# Patient Record
Sex: FEMALE | Race: WHITE | NOT HISPANIC OR LATINO
[De-identification: names, ages, dates, MRNs, and addresses within clinical notes are randomized per-mention and may not be internally consistent; named-entity substitution may affect disease eponyms.]

---

## 2018-06-12 ENCOUNTER — RECORD ABSTRACTING (OUTPATIENT)
Age: 63
End: 2018-06-12

## 2018-06-12 DIAGNOSIS — M54.2 CERVICALGIA: ICD-10-CM

## 2018-06-12 DIAGNOSIS — Z87.898 PERSONAL HISTORY OF OTHER SPECIFIED CONDITIONS: ICD-10-CM

## 2018-06-12 DIAGNOSIS — Z87.891 PERSONAL HISTORY OF NICOTINE DEPENDENCE: ICD-10-CM

## 2018-06-12 DIAGNOSIS — Z87.39 PERSONAL HISTORY OF OTHER DISEASES OF THE MUSCULOSKELETAL SYSTEM AND CONNECTIVE TISSUE: ICD-10-CM

## 2018-06-12 DIAGNOSIS — Z83.3 FAMILY HISTORY OF DIABETES MELLITUS: ICD-10-CM

## 2018-06-12 DIAGNOSIS — C32.0 MALIGNANT NEOPLASM OF GLOTTIS: ICD-10-CM

## 2018-06-12 DIAGNOSIS — Z92.89 PERSONAL HISTORY OF OTHER MEDICAL TREATMENT: ICD-10-CM

## 2018-06-12 DIAGNOSIS — M79.606 PAIN IN LEG, UNSPECIFIED: ICD-10-CM

## 2018-06-12 DIAGNOSIS — R73.09 OTHER ABNORMAL GLUCOSE: ICD-10-CM

## 2018-06-12 DIAGNOSIS — C32.9 MALIGNANT NEOPLASM OF LARYNX, UNSPECIFIED: ICD-10-CM

## 2018-06-12 DIAGNOSIS — R92.2 INCONCLUSIVE MAMMOGRAM: ICD-10-CM

## 2018-06-21 ENCOUNTER — APPOINTMENT (OUTPATIENT)
Dept: INTERNAL MEDICINE | Facility: CLINIC | Age: 63
End: 2018-06-21

## 2018-07-10 ENCOUNTER — APPOINTMENT (OUTPATIENT)
Dept: INTERNAL MEDICINE | Facility: CLINIC | Age: 63
End: 2018-07-10
Payer: COMMERCIAL

## 2018-07-10 VITALS
RESPIRATION RATE: 17 BRPM | DIASTOLIC BLOOD PRESSURE: 80 MMHG | HEART RATE: 86 BPM | HEIGHT: 71 IN | SYSTOLIC BLOOD PRESSURE: 118 MMHG | TEMPERATURE: 97.1 F | WEIGHT: 229 LBS | BODY MASS INDEX: 32.06 KG/M2

## 2018-07-10 DIAGNOSIS — I87.2 VENOUS INSUFFICIENCY (CHRONIC) (PERIPHERAL): ICD-10-CM

## 2018-07-10 DIAGNOSIS — Z12.31 ENCOUNTER FOR SCREENING MAMMOGRAM FOR MALIGNANT NEOPLASM OF BREAST: ICD-10-CM

## 2018-07-10 DIAGNOSIS — Z13.89 ENCOUNTER FOR SCREENING FOR OTHER DISORDER: ICD-10-CM

## 2018-07-10 DIAGNOSIS — Z13.820 ENCOUNTER FOR SCREENING FOR OSTEOPOROSIS: ICD-10-CM

## 2018-07-10 DIAGNOSIS — R60.9 EDEMA, UNSPECIFIED: ICD-10-CM

## 2018-07-10 DIAGNOSIS — J98.8 OTHER SPECIFIED RESPIRATORY DISORDERS: ICD-10-CM

## 2018-07-10 DIAGNOSIS — Z12.11 ENCOUNTER FOR SCREENING FOR MALIGNANT NEOPLASM OF COLON: ICD-10-CM

## 2018-07-10 PROCEDURE — G0444 DEPRESSION SCREEN ANNUAL: CPT

## 2018-07-10 PROCEDURE — 99214 OFFICE O/P EST MOD 30 MIN: CPT | Mod: 25

## 2018-07-10 NOTE — HISTORY OF PRESENT ILLNESS
[FreeTextEntry8] : She's had lower extremity edema for over one year. She saw a vascular surgeon but does not know what he told her Viread in our records from prior he had told her she has venous insufficiency and she should use compression stockings. She does not recall this conversation.\par Due for colon/FIT/mammo/dexa.\par UTD with derm and pap.\par Has appt with head and neck surgeon.  She is unable to breath due to upper airway obstruction.

## 2018-07-10 NOTE — PLAN
[FreeTextEntry1] : For venous insufficiency I recommend she exercise, lose weight, elevate legs and use compression stockings. She can see another vascular surgeon for second opinion.\par I gave her prescription for mammogram, bone density and fecal occult blood testing.\par She will return to office for a physical.\par She will follow up with head and neck surgery regarding her airway obstruction.

## 2018-10-16 ENCOUNTER — APPOINTMENT (OUTPATIENT)
Dept: INTERNAL MEDICINE | Facility: CLINIC | Age: 63
End: 2018-10-16

## 2018-12-03 ENCOUNTER — APPOINTMENT (OUTPATIENT)
Dept: INTERNAL MEDICINE | Facility: CLINIC | Age: 63
End: 2018-12-03

## 2019-01-24 ENCOUNTER — MESSAGE (OUTPATIENT)
Age: 64
End: 2019-01-24

## 2019-01-24 ENCOUNTER — APPOINTMENT (OUTPATIENT)
Dept: INTERNAL MEDICINE | Facility: CLINIC | Age: 64
End: 2019-01-24
Payer: COMMERCIAL

## 2019-01-24 ENCOUNTER — NON-APPOINTMENT (OUTPATIENT)
Age: 64
End: 2019-01-24

## 2019-01-24 VITALS
SYSTOLIC BLOOD PRESSURE: 120 MMHG | WEIGHT: 230 LBS | BODY MASS INDEX: 32.2 KG/M2 | TEMPERATURE: 98 F | DIASTOLIC BLOOD PRESSURE: 74 MMHG | HEIGHT: 71 IN

## 2019-01-24 DIAGNOSIS — M25.562 PAIN IN LEFT KNEE: ICD-10-CM

## 2019-01-24 PROCEDURE — 93000 ELECTROCARDIOGRAM COMPLETE: CPT

## 2019-01-24 PROCEDURE — 36415 COLL VENOUS BLD VENIPUNCTURE: CPT

## 2019-01-24 PROCEDURE — 99396 PREV VISIT EST AGE 40-64: CPT | Mod: 25

## 2019-01-24 NOTE — HISTORY OF PRESENT ILLNESS
[FreeTextEntry1] : physical [de-identified] : Her left leg/knee is very painful.  Wants second opinion  from vascular.\par Has neck pain. Was in PT but stopped.\par Due for derm.

## 2019-01-24 NOTE — PHYSICAL EXAM
[No Acute Distress] : no acute distress [Normal Sclera/Conjunctiva] : normal sclera/conjunctiva [Normal Outer Ear/Nose] : the outer ears and nose were normal in appearance [Thyroid Normal, No Nodules] : the thyroid was normal and there were no nodules present [No Respiratory Distress] : no respiratory distress  [Clear to Auscultation] : lungs were clear to auscultation bilaterally [Normal Rate] : normal rate  [Regular Rhythm] : with a regular rhythm [No Carotid Bruits] : no carotid bruits [No Edema] : there was no peripheral edema [No Axillary Lymphadenopathy] : no axillary lymphadenopathy [No Masses] : no abdominal mass palpated [Normal Posterior Cervical Nodes] : no posterior cervical lymphadenopathy [Normal Anterior Cervical Nodes] : no anterior cervical lymphadenopathy [Grossly Normal Strength/Tone] : grossly normal strength/tone [No Focal Deficits] : no focal deficits [Normal Affect] : the affect was normal

## 2019-01-24 NOTE — PLAN
[FreeTextEntry1] : Check chol, sugar and gen labs.\par Get xrays of knee and neck.  Refer to PT. \par See another vascular about venous insufficiency.\par Gave FIT test again.

## 2019-01-27 LAB
25(OH)D3 SERPL-MCNC: 11 NG/ML
ALBUMIN SERPL ELPH-MCNC: 4.3 G/DL
ALP BLD-CCNC: 65 U/L
ALT SERPL-CCNC: 25 U/L
ANION GAP SERPL CALC-SCNC: 10 MMOL/L
APPEARANCE: CLEAR
AST SERPL-CCNC: 29 U/L
BACTERIA: NEGATIVE
BASOPHILS # BLD AUTO: 0.02 K/UL
BASOPHILS NFR BLD AUTO: 0.3 %
BILIRUB SERPL-MCNC: 0.3 MG/DL
BILIRUBIN URINE: NEGATIVE
BLOOD URINE: ABNORMAL
BUN SERPL-MCNC: 21 MG/DL
CALCIUM SERPL-MCNC: 10 MG/DL
CHLORIDE SERPL-SCNC: 103 MMOL/L
CHOLEST SERPL-MCNC: 215 MG/DL
CHOLEST/HDLC SERPL: 2.4 RATIO
CO2 SERPL-SCNC: 27 MMOL/L
COLOR: YELLOW
CREAT SERPL-MCNC: 0.92 MG/DL
EOSINOPHIL # BLD AUTO: 0.2 K/UL
EOSINOPHIL NFR BLD AUTO: 2.9 %
GLUCOSE QUALITATIVE U: NEGATIVE MG/DL
GLUCOSE SERPL-MCNC: 85 MG/DL
HBA1C MFR BLD HPLC: 5.6 %
HCT VFR BLD CALC: 44.3 %
HDLC SERPL-MCNC: 89 MG/DL
HGB BLD-MCNC: 14.3 G/DL
HYALINE CASTS: 4 /LPF
IMM GRANULOCYTES NFR BLD AUTO: 0.3 %
KETONES URINE: NEGATIVE
LDLC SERPL CALC-MCNC: 112 MG/DL
LEUKOCYTE ESTERASE URINE: NEGATIVE
LYMPHOCYTES # BLD AUTO: 2.25 K/UL
LYMPHOCYTES NFR BLD AUTO: 33.1 %
MAN DIFF?: NORMAL
MCHC RBC-ENTMCNC: 29.1 PG
MCHC RBC-ENTMCNC: 32.3 GM/DL
MCV RBC AUTO: 90.2 FL
MICROSCOPIC-UA: NORMAL
MONOCYTES # BLD AUTO: 0.45 K/UL
MONOCYTES NFR BLD AUTO: 6.6 %
NEUTROPHILS # BLD AUTO: 3.86 K/UL
NEUTROPHILS NFR BLD AUTO: 56.8 %
NITRITE URINE: NEGATIVE
PH URINE: 5.5
PLATELET # BLD AUTO: 294 K/UL
POTASSIUM SERPL-SCNC: 5 MMOL/L
PROT SERPL-MCNC: 7.3 G/DL
PROTEIN URINE: 300 MG/DL
RBC # BLD: 4.91 M/UL
RBC # FLD: 14.1 %
RED BLOOD CELLS URINE: 10 /HPF
SODIUM SERPL-SCNC: 140 MMOL/L
SPECIFIC GRAVITY URINE: 1.03
SQUAMOUS EPITHELIAL CELLS: 3 /HPF
TRIGL SERPL-MCNC: 68 MG/DL
TSH SERPL-ACNC: 3.37 UIU/ML
UROBILINOGEN URINE: NEGATIVE MG/DL
VIT B12 SERPL-MCNC: 539 PG/ML
WBC # FLD AUTO: 6.8 K/UL
WHITE BLOOD CELLS URINE: 2 /HPF

## 2019-01-28 ENCOUNTER — RESULT REVIEW (OUTPATIENT)
Age: 64
End: 2019-01-28

## 2019-02-05 ENCOUNTER — RESULT REVIEW (OUTPATIENT)
Age: 64
End: 2019-02-05

## 2019-02-22 ENCOUNTER — APPOINTMENT (OUTPATIENT)
Dept: VASCULAR SURGERY | Facility: CLINIC | Age: 64
End: 2019-02-22

## 2019-02-25 ENCOUNTER — APPOINTMENT (OUTPATIENT)
Dept: VASCULAR SURGERY | Facility: CLINIC | Age: 64
End: 2019-02-25
Payer: COMMERCIAL

## 2019-02-25 VITALS
OXYGEN SATURATION: 97 % | HEART RATE: 82 BPM | HEIGHT: 71 IN | BODY MASS INDEX: 31.92 KG/M2 | DIASTOLIC BLOOD PRESSURE: 88 MMHG | SYSTOLIC BLOOD PRESSURE: 143 MMHG | WEIGHT: 228 LBS | TEMPERATURE: 97.8 F

## 2019-02-25 PROCEDURE — 99203 OFFICE O/P NEW LOW 30 MIN: CPT

## 2019-02-25 PROCEDURE — 93970 EXTREMITY STUDY: CPT

## 2019-03-20 ENCOUNTER — APPOINTMENT (OUTPATIENT)
Dept: VASCULAR SURGERY | Facility: CLINIC | Age: 64
End: 2019-03-20
Payer: COMMERCIAL

## 2019-03-20 VITALS
SYSTOLIC BLOOD PRESSURE: 168 MMHG | TEMPERATURE: 98.1 F | HEART RATE: 79 BPM | DIASTOLIC BLOOD PRESSURE: 94 MMHG | OXYGEN SATURATION: 97 %

## 2019-03-20 PROCEDURE — 99213 OFFICE O/P EST LOW 20 MIN: CPT

## 2019-04-03 NOTE — HISTORY OF PRESENT ILLNESS
[FreeTextEntry1] : 63 year old female presents for evaluation of left leg swelling. She reports to have had a long history of bilateral lower extremity spider veins but states that her left leg has always been significantly more swollen, heavy and painful. \par Leg swelling is mild in the mornings and significantly worsens through the day. She states that her legs become too painful to carry out her routine daily activities. she has used compression stockings over the last 6 months without any significant improvement in symptoms.\par She denies previous DVT.\par Of note, patient also complains of pain along the posterolateral aspect of her knee, worse with knee flexion.

## 2019-04-03 NOTE — ASSESSMENT
[FreeTextEntry1] : 63 year old female presenting with left leg swelling  and pain that has significantly worsened over the last 6 months.\par Venous ultrasound shows pathological reflux in the calf segment of her GSV. She has no deep reflux or DVT.\par I will obtain a CT Venogram to rule out May Thurner syndrome. If the CTV is negative, her leg swelling is likely from her GSV reflux and she may benefit from percutaneous ablation.\par

## 2019-04-09 NOTE — HISTORY OF PRESENT ILLNESS
[FreeTextEntry1] : 63 year old female presents for evaluation of left leg swelling. She reports to have had a long history of bilateral lower extremity spider veins but states that her left leg has always been significantly more swollen, heavy and painful. \par Leg swelling is mild in the mornings and significantly worsens through the day.\par She denies previous DVT.\par Of note, patient also complains of pain along the posterolateral aspect of her knee, worse with knee flexion. [de-identified] : Continues to have significant left leg swelling, heaviness and pain. She is unable to carry out her daily activities due to pain and discomfort/\par Not alleviated by compression stockings or leg elevation which she has used for over 3 months.\par Recent CT Venogram shows no iliac vein compression.\par Noted to have a right renal mass and liver hemangioma. Patient states that she is aware of these findings from a previous scan.\par Yet to see a urologist, I have counselled her to make an appointment to see one.

## 2019-04-09 NOTE — ASSESSMENT
[FreeTextEntry1] : 63 year old female with symptomatic varicose veins.\par Venous duplex reveals pathologic reflux (> 2 seconds of backwards flow) in distal thigh and calf GSV\par Will plan for RFA ablation. Risks and benefits discussed with patient.\par Also provided patient with results of her recent CT abdomen, she will schedule an appointment with a urologist

## 2019-04-09 NOTE — PHYSICAL EXAM
[Normal Breath Sounds] : Normal breath sounds [Normal Heart Sounds] : normal heart sounds [2+] : left 2+ [Ankle Swelling (On Exam)] : present [Ankle Swelling On The Left] : moderate [Ankle Swelling On The Right] : mild [Varicose Veins Of Lower Extremities] : bilaterally [Alert] : alert [Oriented to Person] : oriented to person [Oriented to Place] : oriented to place [Oriented to Time] : oriented to time [Calm] : calm [JVD] : no jugular venous distention  [Carotid Bruits] : no carotid bruits [] : not present [Abdomen Masses] : No abdominal masses [Purpura] : no purpura  [Petechiae] : no petechiae [Skin Ulcer] : no ulcer [Skin Induration] : no induration [de-identified] : Well appearing

## 2019-04-22 ENCOUNTER — APPOINTMENT (OUTPATIENT)
Age: 64
End: 2019-04-22
Payer: COMMERCIAL

## 2019-04-22 VITALS
TEMPERATURE: 97.7 F | DIASTOLIC BLOOD PRESSURE: 82 MMHG | HEIGHT: 71 IN | BODY MASS INDEX: 30.52 KG/M2 | HEART RATE: 71 BPM | WEIGHT: 218 LBS | SYSTOLIC BLOOD PRESSURE: 134 MMHG | OXYGEN SATURATION: 98 %

## 2019-04-22 PROCEDURE — 36475 ENDOVENOUS RF 1ST VEIN: CPT

## 2019-04-24 ENCOUNTER — APPOINTMENT (OUTPATIENT)
Dept: VASCULAR SURGERY | Facility: CLINIC | Age: 64
End: 2019-04-24
Payer: COMMERCIAL

## 2019-04-24 PROCEDURE — 93971 EXTREMITY STUDY: CPT

## 2019-05-08 ENCOUNTER — APPOINTMENT (OUTPATIENT)
Dept: VASCULAR SURGERY | Facility: CLINIC | Age: 64
End: 2019-05-08
Payer: COMMERCIAL

## 2019-05-22 ENCOUNTER — APPOINTMENT (OUTPATIENT)
Dept: VASCULAR SURGERY | Facility: CLINIC | Age: 64
End: 2019-05-22
Payer: COMMERCIAL

## 2019-05-22 VITALS
HEART RATE: 86 BPM | OXYGEN SATURATION: 98 % | WEIGHT: 214 LBS | SYSTOLIC BLOOD PRESSURE: 150 MMHG | TEMPERATURE: 97.6 F | BODY MASS INDEX: 29.96 KG/M2 | HEIGHT: 71 IN | DIASTOLIC BLOOD PRESSURE: 89 MMHG

## 2019-05-22 PROCEDURE — 99213 OFFICE O/P EST LOW 20 MIN: CPT

## 2019-05-22 PROCEDURE — 93971 EXTREMITY STUDY: CPT

## 2019-05-22 NOTE — HISTORY OF PRESENT ILLNESS
[FreeTextEntry1] : 63 year old female presents for evaluation of left leg swelling. She reports to have had a long history of bilateral lower extremity spider veins but states that her left leg has always been significantly more swollen, heavy and painful. \par Leg swelling is mild in the mornings and significantly worsens through the day. She states that her legs become too painful to carry out her routine daily activities. she has used compression stockings over the last 6 months without any significant improvement in symptoms.\par She denies previous DVT.\par  [de-identified] : CT abdomen and pelvis showed no conclusive evidence of left iliac vein compression or stenosis\par S/P Left GSV ablation last month without improvement in left leg swelling\par She continues to complain of significant left leg swelling, heaviness and pain.

## 2019-05-22 NOTE — ASSESSMENT
[FreeTextEntry1] : 63 year old female with persistent severe left leg swelling, heaviness and pain.\par She is s/p leg GSV ablation last month without improvement in symptoms.\par Venous US today shows no DVT, satisfactory GSV closure.\par I have discussed with patient. May Thurner syndrome continues to be a strong possibility, especially in light of her non improvement with GSV ablation. I explained that despite a normal CT pelvis, she may still have left iliac vein compression.\par I discussed the options of long term compression therapy versus venogram and intravascular ultrasound. She will like to proceed with Left iliac venogram.\par Will schedule for procedure

## 2019-05-22 NOTE — PHYSICAL EXAM
[JVD] : no jugular venous distention  [Carotid Bruits] : no carotid bruits [Normal Breath Sounds] : Normal breath sounds [Normal Heart Sounds] : normal heart sounds [2+] : left 2+ [Ankle Swelling (On Exam)] : present [Ankle Swelling On The Left] : moderate [Varicose Veins Of Lower Extremities] : bilaterally [Ankle Swelling On The Right] : mild [] : not present [Abdomen Masses] : No abdominal masses [Purpura] : no purpura  [Petechiae] : no petechiae [Skin Ulcer] : no ulcer [Skin Induration] : no induration [Alert] : alert [Oriented to Person] : oriented to person [Oriented to Place] : oriented to place [Oriented to Time] : oriented to time [Calm] : calm [de-identified] : Well appearing

## 2019-06-04 ENCOUNTER — APPOINTMENT (OUTPATIENT)
Dept: UROLOGY | Facility: CLINIC | Age: 64
End: 2019-06-04
Payer: COMMERCIAL

## 2019-06-04 VITALS
WEIGHT: 211 LBS | HEIGHT: 71 IN | SYSTOLIC BLOOD PRESSURE: 154 MMHG | BODY MASS INDEX: 29.54 KG/M2 | TEMPERATURE: 97.9 F | DIASTOLIC BLOOD PRESSURE: 83 MMHG | HEART RATE: 82 BPM | RESPIRATION RATE: 17 BRPM

## 2019-06-04 DIAGNOSIS — N28.89 OTHER SPECIFIED DISORDERS OF KIDNEY AND URETER: ICD-10-CM

## 2019-06-04 PROCEDURE — 99203 OFFICE O/P NEW LOW 30 MIN: CPT

## 2019-06-04 NOTE — REVIEW OF SYSTEMS
[see HPI] : see HPI [Presently in menopause ___] : presently in menopause [unfilled] [Wake up at night to urinate  How many times?  ___] : wakes up to urinate [unfilled] times during the night [Told you have blood in urine on a urine test] : told blood was present in a urine test [Leakage of urine with straining, coughing, laughing] : leakage of urine with straining, coughing, laughing [Limb Swelling] : limb swelling [Negative] : Heme/Lymph

## 2019-06-04 NOTE — PHYSICAL EXAM
[Edema] : no peripheral edema [] : no respiratory distress [Exaggerated Use Of Accessory Muscles For Inspiration] : no accessory muscle use [Normal Station and Gait] : the gait and station were normal for the patient's age [No Focal Deficits] : no focal deficits [FreeTextEntry1] : soft, nondistended

## 2019-06-04 NOTE — HISTORY OF PRESENT ILLNESS
[FreeTextEntry1] : 62 y/o F h/o L varicocose vein ablation, found to have a R renal mass incidentally on CT scan. The renal mass was then further characterized by MRI, approx 2cm R mid/lower pole. She is here to discuss management options. Images in [facility]\par \par She had microscopic hematuria in the past\par \par Past smoker 34 years ago, 1pk per day. Denies exposure to petroleum and textiles. \par \par Denies F/C, N/V, CP, SOB, abd pain, dysuria, hematuria

## 2019-06-24 ENCOUNTER — OUTPATIENT (OUTPATIENT)
Dept: OUTPATIENT SERVICES | Facility: HOSPITAL | Age: 64
LOS: 1 days | End: 2019-06-24

## 2019-06-24 VITALS
HEART RATE: 76 BPM | WEIGHT: 210.1 LBS | SYSTOLIC BLOOD PRESSURE: 120 MMHG | HEIGHT: 70 IN | OXYGEN SATURATION: 99 % | DIASTOLIC BLOOD PRESSURE: 70 MMHG | RESPIRATION RATE: 14 BRPM | TEMPERATURE: 98 F

## 2019-06-24 DIAGNOSIS — Z90.02 ACQUIRED ABSENCE OF LARYNX: Chronic | ICD-10-CM

## 2019-06-24 DIAGNOSIS — N28.89 OTHER SPECIFIED DISORDERS OF KIDNEY AND URETER: ICD-10-CM

## 2019-06-24 DIAGNOSIS — J98.4 OTHER DISORDERS OF LUNG: ICD-10-CM

## 2019-06-24 DIAGNOSIS — Z98.890 OTHER SPECIFIED POSTPROCEDURAL STATES: Chronic | ICD-10-CM

## 2019-06-24 LAB
ALBUMIN SERPL ELPH-MCNC: 3.7 G/DL — SIGNIFICANT CHANGE UP (ref 3.3–5)
ALP SERPL-CCNC: 54 U/L — SIGNIFICANT CHANGE UP (ref 40–120)
ALT FLD-CCNC: 17 U/L — SIGNIFICANT CHANGE UP (ref 4–33)
ANION GAP SERPL CALC-SCNC: 14 MMO/L — SIGNIFICANT CHANGE UP (ref 7–14)
APPEARANCE UR: CLEAR — SIGNIFICANT CHANGE UP
AST SERPL-CCNC: 19 U/L — SIGNIFICANT CHANGE UP (ref 4–32)
BACTERIA # UR AUTO: SIGNIFICANT CHANGE UP
BILIRUB SERPL-MCNC: 0.2 MG/DL — SIGNIFICANT CHANGE UP (ref 0.2–1.2)
BILIRUB UR-MCNC: NEGATIVE — SIGNIFICANT CHANGE UP
BLD GP AB SCN SERPL QL: NEGATIVE — SIGNIFICANT CHANGE UP
BLOOD UR QL VISUAL: SIGNIFICANT CHANGE UP
BUN SERPL-MCNC: 17 MG/DL — SIGNIFICANT CHANGE UP (ref 7–23)
CALCIUM SERPL-MCNC: 9.2 MG/DL — SIGNIFICANT CHANGE UP (ref 8.4–10.5)
CHLORIDE SERPL-SCNC: 104 MMOL/L — SIGNIFICANT CHANGE UP (ref 98–107)
CO2 SERPL-SCNC: 24 MMOL/L — SIGNIFICANT CHANGE UP (ref 22–31)
COLOR SPEC: YELLOW — SIGNIFICANT CHANGE UP
CREAT SERPL-MCNC: 0.72 MG/DL — SIGNIFICANT CHANGE UP (ref 0.5–1.3)
GLUCOSE SERPL-MCNC: 60 MG/DL — LOW (ref 70–99)
GLUCOSE UR-MCNC: NEGATIVE — SIGNIFICANT CHANGE UP
HCT VFR BLD CALC: 41.1 % — SIGNIFICANT CHANGE UP (ref 34.5–45)
HGB BLD-MCNC: 13.2 G/DL — SIGNIFICANT CHANGE UP (ref 11.5–15.5)
HYALINE CASTS # UR AUTO: NEGATIVE — SIGNIFICANT CHANGE UP
KETONES UR-MCNC: NEGATIVE — SIGNIFICANT CHANGE UP
LEUKOCYTE ESTERASE UR-ACNC: SIGNIFICANT CHANGE UP
MCHC RBC-ENTMCNC: 29.1 PG — SIGNIFICANT CHANGE UP (ref 27–34)
MCHC RBC-ENTMCNC: 32.1 % — SIGNIFICANT CHANGE UP (ref 32–36)
MCV RBC AUTO: 90.5 FL — SIGNIFICANT CHANGE UP (ref 80–100)
NITRITE UR-MCNC: NEGATIVE — SIGNIFICANT CHANGE UP
NRBC # FLD: 0 K/UL — SIGNIFICANT CHANGE UP (ref 0–0)
PH UR: 7 — SIGNIFICANT CHANGE UP (ref 5–8)
PLATELET # BLD AUTO: 273 K/UL — SIGNIFICANT CHANGE UP (ref 150–400)
PMV BLD: 10.6 FL — SIGNIFICANT CHANGE UP (ref 7–13)
POTASSIUM SERPL-MCNC: 4.5 MMOL/L — SIGNIFICANT CHANGE UP (ref 3.5–5.3)
POTASSIUM SERPL-SCNC: 4.5 MMOL/L — SIGNIFICANT CHANGE UP (ref 3.5–5.3)
PROT SERPL-MCNC: 6.3 G/DL — SIGNIFICANT CHANGE UP (ref 6–8.3)
PROT UR-MCNC: 300 — HIGH
RBC # BLD: 4.54 M/UL — SIGNIFICANT CHANGE UP (ref 3.8–5.2)
RBC # FLD: 13.6 % — SIGNIFICANT CHANGE UP (ref 10.3–14.5)
RBC CASTS # UR COMP ASSIST: HIGH (ref 0–?)
RH IG SCN BLD-IMP: POSITIVE — SIGNIFICANT CHANGE UP
SODIUM SERPL-SCNC: 142 MMOL/L — SIGNIFICANT CHANGE UP (ref 135–145)
SP GR SPEC: 1.02 — SIGNIFICANT CHANGE UP (ref 1–1.04)
SQUAMOUS # UR AUTO: SIGNIFICANT CHANGE UP
UROBILINOGEN FLD QL: NORMAL — SIGNIFICANT CHANGE UP
WBC # BLD: 5.97 K/UL — SIGNIFICANT CHANGE UP (ref 3.8–10.5)
WBC # FLD AUTO: 5.97 K/UL — SIGNIFICANT CHANGE UP (ref 3.8–10.5)
WBC UR QL: HIGH (ref 0–?)

## 2019-06-24 RX ORDER — SODIUM CHLORIDE 9 MG/ML
1000 INJECTION, SOLUTION INTRAVENOUS
Refills: 0 | Status: DISCONTINUED | OUTPATIENT
Start: 2019-07-10 | End: 2019-07-11

## 2019-06-24 NOTE — H&P PST ADULT - NSICDXPROBLEM_GEN_ALL_CORE_FT
PROBLEM DIAGNOSES  Problem: Other specified disorders of kidney and ureter  Assessment and Plan:  Patient is scheduled for Right laparoscopic partial nephrectomy, possible radical, possible open scheduled on 7/10/2019.   Preop instructions, pepcid, surgical scrub provided. Pt stated understanding. Teach back method utilized.   Pending medical evaluation per surgeon and obtain for PST.       Problem: Restrictive airway disease  Assessment and Plan: OR booking notified PROBLEM DIAGNOSES  Problem: Other specified disorders of kidney and ureter  Assessment and Plan:  Patient is scheduled for Right laparoscopic partial nephrectomy, possible radical, possible open scheduled on 7/10/2019.   Preop instructions, pepcid, surgical scrub provided. Pt stated understanding. Teach back method utilized.   Pending medical evalution per surgeon and obtain for PST.   Case Discussed with Dr. Edwards       Problem: Restrictive airway disease  Assessment and Plan: OR booking notified  Notified Dr. Edwards.

## 2019-06-24 NOTE — H&P PST ADULT - ASSESSMENT
Pre op diagnosis: Other specified disorders of kidney and ureter. Patient is scheduled for Right laparoscopic partial nephrectomy, possible radical, possible open scheduled on 7/10/2019.

## 2019-06-24 NOTE — H&P PST ADULT - NSICDXPASTMEDICALHX_GEN_ALL_CORE_FT
PAST MEDICAL HISTORY:  Other specified disorders of kidney and ureter     Restrictive airway disease     Varicose veins     Vocal cord cancer 1985

## 2019-06-24 NOTE — H&P PST ADULT - NSANTHOSAYNRD_GEN_A_CORE
No. SHAVON screening performed.  STOP BANG Legend: 0-2 = LOW Risk; 3-4 = INTERMEDIATE Risk; 5-8 = HIGH Risk

## 2019-06-24 NOTE — H&P PST ADULT - NEGATIVE GENERAL GENITOURINARY SYMPTOMS
no dysuria/no flank pain R/normal urinary frequency/no urinary hesitancy/no incontinence/no urine discoloration/no bladder infections/no hematuria/no flank pain L

## 2019-06-24 NOTE — H&P PST ADULT - NSICDXPASTSURGICALHX_GEN_ALL_CORE_FT
PAST SURGICAL HISTORY:   delivery delivered 1985    H/O partial laryngectomy     History of surgery Varicose vein ablation

## 2019-06-24 NOTE — H&P PST ADULT - NEGATIVE OPHTHALMOLOGIC SYMPTOMS
no photophobia/no blurred vision R/no diplopia/no blurred vision L/no discharge L/no irritation L/no discharge R/no pain L/no irritation R/no pain R

## 2019-06-24 NOTE — H&P PST ADULT - HISTORY OF PRESENT ILLNESS
Patient is a 63 year old female with a history of Left varicose vein ablation in February 2019; found to have a Right renal mass found incidentally on Ct scan. Patient was referred to Dr. Dinero for an evaluation. Pre op diagnosis: Other specified disorders of kidney and ureter. Patient is scheduled for Right laparoscopic partial nephrectomy, possible radical, possible open scheduled on 7/10/2019.

## 2019-06-24 NOTE — H&P PST ADULT - RS GEN PE MLT RESP DETAILS PC
clear to auscultation bilaterally/airway patent/no rales/no wheezes/good air movement/no rhonchi/respirations non-labored/breath sounds equal

## 2019-06-24 NOTE — H&P PST ADULT - NEGATIVE MUSCULOSKELETAL SYMPTOMS
no myalgia/no stiffness/no arthritis/no muscle cramps/no back pain/no muscle weakness/no joint swelling/no leg pain L/no arm pain L/no leg pain R/no neck pain/no arm pain R

## 2019-06-24 NOTE — H&P PST ADULT - NEGATIVE ENMT SYMPTOMS
no nasal obstruction/no nasal congestion/no dysphagia/no nose bleeds/no dry mouth/no sinus symptoms/no hearing difficulty/no nasal discharge/no recurrent cold sores/no throat pain/no vertigo/no post-nasal discharge/no ear pain/no tinnitus/no gum bleeding

## 2019-06-24 NOTE — H&P PST ADULT - NEGATIVE NEUROLOGICAL SYMPTOMS
no tremors/no confusion/no paresthesias/no generalized seizures/no focal seizures/no difficulty walking/no vertigo/no loss of sensation/no loss of consciousness/no weakness/no headache/no transient paralysis

## 2019-06-24 NOTE — H&P PST ADULT - NEGATIVE ALLERGY TYPES
no reactions to medicines/no indoor environmental allergies/no reactions to food/no reactions to insect bites/no outdoor environmental allergies/no reactions to animals

## 2019-06-24 NOTE — H&P PST ADULT - VENOUS THROMBOEMBOLISM CURRENT STATUS
(1) other risk factor (includes escalating BMI, pack-years of smoking, diabetes requiring insulin, chemotherapy, female gender and length of surgery)/(1) varicose veins/(1) swollen legs (current)/(2) malignancy (present or previous)

## 2019-06-24 NOTE — H&P PST ADULT - ACTIVITY
Walking, climbing up and down stairs, House chores, shopping, ADLs ( Taking care of 96 year old mother )

## 2019-06-26 LAB
BACTERIA UR CULT: SIGNIFICANT CHANGE UP
SPECIMEN SOURCE: SIGNIFICANT CHANGE UP

## 2019-07-02 ENCOUNTER — TRANSCRIPTION ENCOUNTER (OUTPATIENT)
Age: 64
End: 2019-07-02

## 2019-07-02 ENCOUNTER — APPOINTMENT (OUTPATIENT)
Dept: FAMILY MEDICINE | Facility: CLINIC | Age: 64
End: 2019-07-02
Payer: COMMERCIAL

## 2019-07-02 VITALS
BODY MASS INDEX: 27.89 KG/M2 | OXYGEN SATURATION: 98 % | TEMPERATURE: 98 F | WEIGHT: 200 LBS | RESPIRATION RATE: 14 BRPM | HEART RATE: 74 BPM | DIASTOLIC BLOOD PRESSURE: 80 MMHG | SYSTOLIC BLOOD PRESSURE: 136 MMHG

## 2019-07-02 DIAGNOSIS — Z01.818 ENCOUNTER FOR OTHER PREPROCEDURAL EXAMINATION: ICD-10-CM

## 2019-07-02 PROCEDURE — 99204 OFFICE O/P NEW MOD 45 MIN: CPT

## 2019-07-02 RX ORDER — OSELTAMIVIR PHOSPHATE 75 MG/1
75 CAPSULE ORAL
Qty: 10 | Refills: 0 | Status: DISCONTINUED | COMMUNITY
Start: 2019-02-11 | End: 2019-07-02

## 2019-07-02 RX ORDER — CYCLOBENZAPRINE HYDROCHLORIDE 10 MG/1
10 TABLET, FILM COATED ORAL
Refills: 0 | Status: DISCONTINUED | COMMUNITY
End: 2019-07-02

## 2019-07-02 NOTE — PLAN
[FreeTextEntry1] : -labs reviewed, wnl \par -EKG reviewed, NSR\par -CXR done at [facility] reviewed, no active pulmonary disease \par -pt is medically optimized for planned procedure\par -she is advised to inform the anesthesiologist prior to the procedure that she has a restrictive airway 2/2 her throat surgery

## 2019-07-02 NOTE — ASSESSMENT
[Ischemic Heart Disease] : Ischemic Heart Disease - No (0) [High Risk Surgery - Intraperitoneal, Intrathoracic or Supringuinal Vascular Procedures] : High Risk Surgery - Intraperitoneal, Intrathoracic or Supringuinal Vascular Procedures - No (0) [Congestive Heart Failure] : Congestive Heart Failure - No (0) [Prior Cerebrovascular Accident or TIA] : Prior Cerebrovascular Accident or TIA - No (0) [Creatinine >= 2mg/dL (1 Point)] : Creatinine >= 2mg/dL - No (0) [Insulin-dependent Diabetic (1 Point)] : Insulin-dependent Diabetic - No (0) [Patient Optimized for Surgery] : Patient optimized for surgery

## 2019-07-02 NOTE — HISTORY OF PRESENT ILLNESS
[No Pertinent Cardiac History] : no history of aortic stenosis, atrial fibrillation, coronary artery disease, recent myocardial infarction, or implantable device/pacemaker [No Pertinent Pulmonary History] : no history of asthma, COPD, sleep apnea, or smoking [No Adverse Anesthesia Reaction] : no adverse anesthesia reaction in self or family member [Good (7-10 METs)] : Good (7-10 METs) [(Patient denies any chest pain, claudication, dyspnea on exertion, orthopnea, palpitations or syncope)] : Patient denies any chest pain, claudication, dyspnea on exertion, orthopnea, palpitations or syncope [Chronic Anticoagulation] : no chronic anticoagulation [Diabetes] : no diabetes [Chronic Kidney Disease] : no chronic kidney disease [FreeTextEntry2] : 7/10 [FreeTextEntry1] : partial nephrectomy, right [FreeTextEntry3] : dr. ross  [FreeTextEntry4] : 63 y.o. F with PMHx of throat cancer s/p surgery in 1985 here as new pt for MCA for laparascopic right-sided partial nephrectomy 2/2 incidentally noted renal mass. Pt is otherwise feeling well, no acute complaints. She is a former smoker, 15 pack year history, quit in 1985. \par

## 2019-07-09 ENCOUNTER — TRANSCRIPTION ENCOUNTER (OUTPATIENT)
Age: 64
End: 2019-07-09

## 2019-07-10 ENCOUNTER — INPATIENT (INPATIENT)
Facility: HOSPITAL | Age: 64
LOS: 1 days | Discharge: HOME | End: 2019-07-12
Attending: UROLOGY | Admitting: UROLOGY
Payer: COMMERCIAL

## 2019-07-10 ENCOUNTER — RESULT REVIEW (OUTPATIENT)
Age: 64
End: 2019-07-10

## 2019-07-10 ENCOUNTER — APPOINTMENT (OUTPATIENT)
Dept: UROLOGY | Facility: HOSPITAL | Age: 64
End: 2019-07-10

## 2019-07-10 VITALS
HEART RATE: 73 BPM | WEIGHT: 210.1 LBS | RESPIRATION RATE: 16 BRPM | TEMPERATURE: 98 F | SYSTOLIC BLOOD PRESSURE: 146 MMHG | DIASTOLIC BLOOD PRESSURE: 65 MMHG | HEIGHT: 70 IN | OXYGEN SATURATION: 99 %

## 2019-07-10 DIAGNOSIS — N28.89 OTHER SPECIFIED DISORDERS OF KIDNEY AND URETER: ICD-10-CM

## 2019-07-10 DIAGNOSIS — Z29.9 ENCOUNTER FOR PROPHYLACTIC MEASURES, UNSPECIFIED: ICD-10-CM

## 2019-07-10 DIAGNOSIS — Z98.890 OTHER SPECIFIED POSTPROCEDURAL STATES: Chronic | ICD-10-CM

## 2019-07-10 DIAGNOSIS — C32.0 MALIGNANT NEOPLASM OF GLOTTIS: ICD-10-CM

## 2019-07-10 DIAGNOSIS — E55.9 VITAMIN D DEFICIENCY, UNSPECIFIED: ICD-10-CM

## 2019-07-10 DIAGNOSIS — Z90.02 ACQUIRED ABSENCE OF LARYNX: Chronic | ICD-10-CM

## 2019-07-10 LAB — RH IG SCN BLD-IMP: POSITIVE — SIGNIFICANT CHANGE UP

## 2019-07-10 PROCEDURE — 88307 TISSUE EXAM BY PATHOLOGIST: CPT | Mod: 26

## 2019-07-10 PROCEDURE — 50543 LAPARO PARTIAL NEPHRECTOMY: CPT | Mod: RT

## 2019-07-10 PROCEDURE — 88331 PATH CONSLTJ SURG 1 BLK 1SPC: CPT | Mod: 26

## 2019-07-10 PROCEDURE — 76998 US GUIDE INTRAOP: CPT | Mod: 26

## 2019-07-10 PROCEDURE — 99222 1ST HOSP IP/OBS MODERATE 55: CPT

## 2019-07-10 PROCEDURE — 88305 TISSUE EXAM BY PATHOLOGIST: CPT | Mod: 26

## 2019-07-10 PROCEDURE — 88312 SPECIAL STAINS GROUP 1: CPT | Mod: 26

## 2019-07-10 RX ORDER — SODIUM CHLORIDE 9 MG/ML
500 INJECTION, SOLUTION INTRAVENOUS ONCE
Refills: 0 | Status: COMPLETED | OUTPATIENT
Start: 2019-07-10 | End: 2019-07-10

## 2019-07-10 RX ORDER — ACETAMINOPHEN 500 MG
1000 TABLET ORAL ONCE
Refills: 0 | Status: COMPLETED | OUTPATIENT
Start: 2019-07-11 | End: 2019-07-11

## 2019-07-10 RX ORDER — ONDANSETRON 8 MG/1
4 TABLET, FILM COATED ORAL EVERY 6 HOURS
Refills: 0 | Status: DISCONTINUED | OUTPATIENT
Start: 2019-07-10 | End: 2019-07-12

## 2019-07-10 RX ORDER — ONDANSETRON 8 MG/1
4 TABLET, FILM COATED ORAL ONCE
Refills: 0 | Status: COMPLETED | OUTPATIENT
Start: 2019-07-10 | End: 2019-07-10

## 2019-07-10 RX ORDER — HYDROMORPHONE HYDROCHLORIDE 2 MG/ML
1 INJECTION INTRAMUSCULAR; INTRAVENOUS; SUBCUTANEOUS
Refills: 0 | Status: DISCONTINUED | OUTPATIENT
Start: 2019-07-10 | End: 2019-07-10

## 2019-07-10 RX ORDER — OXYCODONE AND ACETAMINOPHEN 5; 325 MG/1; MG/1
1 TABLET ORAL EVERY 4 HOURS
Refills: 0 | Status: DISCONTINUED | OUTPATIENT
Start: 2019-07-10 | End: 2019-07-10

## 2019-07-10 RX ORDER — ACETAMINOPHEN 500 MG
1000 TABLET ORAL ONCE
Refills: 0 | Status: DISCONTINUED | OUTPATIENT
Start: 2019-07-10 | End: 2019-07-10

## 2019-07-10 RX ORDER — TRAMADOL HYDROCHLORIDE 50 MG/1
50 TABLET ORAL ONCE
Refills: 0 | Status: DISCONTINUED | OUTPATIENT
Start: 2019-07-10 | End: 2019-07-10

## 2019-07-10 RX ORDER — DOCUSATE SODIUM 100 MG
100 CAPSULE ORAL THREE TIMES A DAY
Refills: 0 | Status: DISCONTINUED | OUTPATIENT
Start: 2019-07-10 | End: 2019-07-12

## 2019-07-10 RX ORDER — SODIUM CHLORIDE 9 MG/ML
1000 INJECTION, SOLUTION INTRAVENOUS ONCE
Refills: 0 | Status: COMPLETED | OUTPATIENT
Start: 2019-07-10 | End: 2019-07-10

## 2019-07-10 RX ORDER — ACETAMINOPHEN 500 MG
650 TABLET ORAL EVERY 6 HOURS
Refills: 0 | Status: DISCONTINUED | OUTPATIENT
Start: 2019-07-10 | End: 2019-07-12

## 2019-07-10 RX ORDER — SENNA PLUS 8.6 MG/1
2 TABLET ORAL AT BEDTIME
Refills: 0 | Status: DISCONTINUED | OUTPATIENT
Start: 2019-07-10 | End: 2019-07-12

## 2019-07-10 RX ORDER — HYDROMORPHONE HYDROCHLORIDE 2 MG/ML
0.5 INJECTION INTRAMUSCULAR; INTRAVENOUS; SUBCUTANEOUS
Refills: 0 | Status: DISCONTINUED | OUTPATIENT
Start: 2019-07-10 | End: 2019-07-10

## 2019-07-10 RX ORDER — HEPARIN SODIUM 5000 [USP'U]/ML
5000 INJECTION INTRAVENOUS; SUBCUTANEOUS EVERY 8 HOURS
Refills: 0 | Status: DISCONTINUED | OUTPATIENT
Start: 2019-07-10 | End: 2019-07-12

## 2019-07-10 RX ORDER — OXYCODONE AND ACETAMINOPHEN 5; 325 MG/1; MG/1
2 TABLET ORAL EVERY 6 HOURS
Refills: 0 | Status: DISCONTINUED | OUTPATIENT
Start: 2019-07-10 | End: 2019-07-10

## 2019-07-10 RX ADMIN — Medication 100 MILLIGRAM(S): at 14:48

## 2019-07-10 RX ADMIN — HYDROMORPHONE HYDROCHLORIDE 0.5 MILLIGRAM(S): 2 INJECTION INTRAMUSCULAR; INTRAVENOUS; SUBCUTANEOUS at 10:37

## 2019-07-10 RX ADMIN — SODIUM CHLORIDE 1000 MILLILITER(S): 9 INJECTION, SOLUTION INTRAVENOUS at 14:41

## 2019-07-10 RX ADMIN — ONDANSETRON 4 MILLIGRAM(S): 8 TABLET, FILM COATED ORAL at 20:53

## 2019-07-10 RX ADMIN — HYDROMORPHONE HYDROCHLORIDE 0.5 MILLIGRAM(S): 2 INJECTION INTRAMUSCULAR; INTRAVENOUS; SUBCUTANEOUS at 10:55

## 2019-07-10 RX ADMIN — HEPARIN SODIUM 5000 UNIT(S): 5000 INJECTION INTRAVENOUS; SUBCUTANEOUS at 14:48

## 2019-07-10 RX ADMIN — OXYCODONE AND ACETAMINOPHEN 2 TABLET(S): 5; 325 TABLET ORAL at 18:14

## 2019-07-10 RX ADMIN — TRAMADOL HYDROCHLORIDE 50 MILLIGRAM(S): 50 TABLET ORAL at 23:00

## 2019-07-10 RX ADMIN — ONDANSETRON 4 MILLIGRAM(S): 8 TABLET, FILM COATED ORAL at 11:02

## 2019-07-10 RX ADMIN — SODIUM CHLORIDE 125 MILLILITER(S): 9 INJECTION, SOLUTION INTRAVENOUS at 10:37

## 2019-07-10 RX ADMIN — Medication 100 MILLIGRAM(S): at 22:02

## 2019-07-10 RX ADMIN — SODIUM CHLORIDE 1000 MILLILITER(S): 9 INJECTION, SOLUTION INTRAVENOUS at 12:30

## 2019-07-10 RX ADMIN — HEPARIN SODIUM 5000 UNIT(S): 5000 INJECTION INTRAVENOUS; SUBCUTANEOUS at 22:03

## 2019-07-10 RX ADMIN — TRAMADOL HYDROCHLORIDE 50 MILLIGRAM(S): 50 TABLET ORAL at 22:02

## 2019-07-10 NOTE — CONSULT NOTE ADULT - PROBLEM SELECTOR RECOMMENDATION 2
-h/o vocal cord cancer s/p resection in 1985, upper airway disease likely related to surgery (?scar tissue) at that time  -no significant residual effect now, sating well, monitor sats, incentive spirometry

## 2019-07-10 NOTE — CONSULT NOTE ADULT - ASSESSMENT
63F with h/o varicose vein ablation, vocal cord cancer s/p resection in 1985 (no recurrence) c/b restrictive airway disease at that time, vit D deficiency, incidental right renal mass found on CT scan, s/p right lap partial nephrectomy on 7/10/19.

## 2019-07-10 NOTE — CONSULT NOTE ADULT - PROBLEM SELECTOR RECOMMENDATION 9
-s/p right lap partial nephrectomy 7/10/19  -postop management per urology, pain control, postop lab, IVF, incentive spirometry, dvt ppx

## 2019-07-10 NOTE — CONSULT NOTE ADULT - SUBJECTIVE AND OBJECTIVE BOX
Elizabeth Pradhan MD  Pager [phone]    HPI:  Patient is a 63 year old female with a history of Left varicose vein ablation in 2019, vocal cord cancer s/p resection in  (no chemo/RT) c/b restrictive airway disease at that time, vit D deficiency, incidental right renal mass found on CT scan, admitted for right lap partial nephrectomy on 7/10/19.   Patient seen in pacu postop, c/o nausea and dry heaves, no vomiting, pain relatively controlled, denies chest pain/sob/dizziness, denies h/o cardiac disease.  Denies hematuria/flank pain.    PAST MEDICAL & SURGICAL HISTORY:  Varicose veins  Vocal cord cancer:   Restrictive airway disease  Other specified disorders of kidney and ureter  History of surgery: Varicose vein ablation   delivery delivered: 1985  H/O partial laryngectomy:       Review of Systems:   CONSTITUTIONAL: No fever, weight loss, or fatigue  EYES: No eye pain, visual disturbances, or discharge  ENMT:  No difficulty hearing, tinnitus, vertigo; No sinus or throat pain  NECK: No pain or stiffness  BREASTS: No pain, masses, or nipple discharge  RESPIRATORY: No cough, wheezing, chills or hemoptysis; No shortness of breath  CARDIOVASCULAR: No chest pain, palpitations, dizziness, or leg swelling  GASTROINTESTINAL: No abdominal or epigastric pain. No nausea, vomiting, or hematemesis; No diarrhea or constipation. No melena or hematochezia.  GENITOURINARY: No dysuria, frequency, hematuria, or incontinence  NEUROLOGICAL: No headaches, memory loss, loss of strength, numbness, or tremors  SKIN: No itching, burning, rashes, or lesions   LYMPH NODES: No enlarged glands  ENDOCRINE: No heat or cold intolerance; No hair loss  MUSCULOSKELETAL: No joint pain or swelling; No muscle, back, or extremity pain  PSYCHIATRIC: No depression, anxiety, mood swings, or difficulty sleeping  HEME/LYMPH: No easy bruising, or bleeding gums  ALLERY AND IMMUNOLOGIC: No hives or eczema    Allergies    No Known Allergies    Intolerances    Social History:  former smoker, 1 ppd x15 years, quit in , 1-2 drinks liquor/month    FAMILY HISTORY:  FH: kidney disease: Kidney mass - Sister      Home Medications:  Move Freely: orally once a day- For Joint medication  (10 Jul 2019 06:23)  Vitamin D2 50,000 intl units (1.25 mg) oral capsule: 1 cap(s) orally once a week (10 Jul 2019 06:23)      MEDICATIONS  (STANDING):  docusate sodium 100 milliGRAM(s) Oral three times a day  heparin  Injectable 5000 Unit(s) SubCutaneous every 8 hours  lactated ringers. 1000 milliLiter(s) (125 mL/Hr) IV Continuous <Continuous>    MEDICATIONS  (PRN):  acetaminophen   Tablet .. 650 milliGRAM(s) Oral every 6 hours PRN Mild Pain (1 - 3)  HYDROmorphone  Injectable 0.5 milliGRAM(s) IV Push every 10 minutes PRN Moderate Pain (4 - 6)  HYDROmorphone  Injectable 1 milliGRAM(s) IV Push every 10 minutes PRN Severe Pain (7 - 10)  oxyCODONE    5 mG/acetaminophen 325 mG 1 Tablet(s) Oral every 4 hours PRN Moderate Pain (4 - 6)  oxyCODONE    5 mG/acetaminophen 325 mG 2 Tablet(s) Oral every 6 hours PRN Severe Pain (7 - 10)  senna 2 Tablet(s) Oral at bedtime PRN Constipation      Vital Signs Last 24 Hrs  T(C): 36.4 (10 Jul 2019 11:30), Max: 36.5 (10 Jul 2019 06:15)  T(F): 97.5 (10 Jul 2019 11:30), Max: 97.7 (10 Jul 2019 06:15)  HR: 67 (10 Jul 2019 11:30) (60 - 73)  BP: 133/67 (10 Jul 2019 11:30) (126/62 - 146/65)  BP(mean): 83 (10 Jul 2019 11:30) (78 - 94)  RR: 19 (10 Jul 2019 11:30) (14 - 25)  SpO2: 93% (10 Jul 2019 11:30) (93% - 100%)  CAPILLARY BLOOD GLUCOSE        I&O's Summary    10 Jul 2019 07:01  -  10 Jul 2019 11:54  --------------------------------------------------------  IN: 125 mL / OUT: 35 mL / NET: 90 mL        PHYSICAL EXAM:  GENERAL: NAD, well-developed  HEAD:  Atraumatic, Normocephalic  EYES: EOMI, conjunctiva and sclera clear  NECK: Supple, No JVD  CHEST/LUNG: Clear to auscultation bilaterally; No wheeze  HEART: Regular rate and rhythm; No murmurs, rubs, or gallops  ABDOMEN: Soft, incisional tenderness, Nondistended; Bowel sounds present  : raygoza  EXTREMITIES:  2+ Peripheral Pulses, No clubbing, cyanosis, or edema  PSYCH: AAOx3  NEUROLOGY: non-focal  SKIN: No rashes or lesions    LABS:    preop EKG (19): NSR, no ischemic change  old echo (7/3/13): normal LVEF 65%, E-A change (impaired LV relaxation), mild AI/PAH      Microbiology     RADIOLOGY & ADDITIONAL TESTS:    Imaging Personally Reviewed:    Consultant(s) Notes Reviewed:      Care Discussed with Consultants/Other Providers:

## 2019-07-10 NOTE — ASU PATIENT PROFILE, ADULT - PMH
Other specified disorders of kidney and ureter    Restrictive airway disease    Varicose veins    Vocal cord cancer  1985

## 2019-07-11 LAB
ANION GAP SERPL CALC-SCNC: 8 MMO/L — SIGNIFICANT CHANGE UP (ref 7–14)
BASOPHILS # BLD AUTO: 0.03 K/UL — SIGNIFICANT CHANGE UP (ref 0–0.2)
BASOPHILS NFR BLD AUTO: 0.2 % — SIGNIFICANT CHANGE UP (ref 0–2)
BUN SERPL-MCNC: 15 MG/DL — SIGNIFICANT CHANGE UP (ref 7–23)
CALCIUM SERPL-MCNC: 8.9 MG/DL — SIGNIFICANT CHANGE UP (ref 8.4–10.5)
CHLORIDE SERPL-SCNC: 104 MMOL/L — SIGNIFICANT CHANGE UP (ref 98–107)
CO2 SERPL-SCNC: 26 MMOL/L — SIGNIFICANT CHANGE UP (ref 22–31)
CREAT SERPL-MCNC: 0.93 MG/DL — SIGNIFICANT CHANGE UP (ref 0.5–1.3)
EOSINOPHIL # BLD AUTO: 0 K/UL — SIGNIFICANT CHANGE UP (ref 0–0.5)
EOSINOPHIL NFR BLD AUTO: 0 % — SIGNIFICANT CHANGE UP (ref 0–6)
GLUCOSE SERPL-MCNC: 101 MG/DL — HIGH (ref 70–99)
HCT VFR BLD CALC: 36.6 % — SIGNIFICANT CHANGE UP (ref 34.5–45)
HGB BLD-MCNC: 12 G/DL — SIGNIFICANT CHANGE UP (ref 11.5–15.5)
IMM GRANULOCYTES NFR BLD AUTO: 0.4 % — SIGNIFICANT CHANGE UP (ref 0–1.5)
LYMPHOCYTES # BLD AUTO: 1.12 K/UL — SIGNIFICANT CHANGE UP (ref 1–3.3)
LYMPHOCYTES # BLD AUTO: 8.7 % — LOW (ref 13–44)
MCHC RBC-ENTMCNC: 29.3 PG — SIGNIFICANT CHANGE UP (ref 27–34)
MCHC RBC-ENTMCNC: 32.8 % — SIGNIFICANT CHANGE UP (ref 32–36)
MCV RBC AUTO: 89.5 FL — SIGNIFICANT CHANGE UP (ref 80–100)
MONOCYTES # BLD AUTO: 1.08 K/UL — HIGH (ref 0–0.9)
MONOCYTES NFR BLD AUTO: 8.4 % — SIGNIFICANT CHANGE UP (ref 2–14)
NEUTROPHILS # BLD AUTO: 10.54 K/UL — HIGH (ref 1.8–7.4)
NEUTROPHILS NFR BLD AUTO: 82.3 % — HIGH (ref 43–77)
NRBC # FLD: 0 K/UL — SIGNIFICANT CHANGE UP (ref 0–0)
PLATELET # BLD AUTO: 213 K/UL — SIGNIFICANT CHANGE UP (ref 150–400)
PMV BLD: 10.1 FL — SIGNIFICANT CHANGE UP (ref 7–13)
POTASSIUM SERPL-MCNC: 4.3 MMOL/L — SIGNIFICANT CHANGE UP (ref 3.5–5.3)
POTASSIUM SERPL-SCNC: 4.3 MMOL/L — SIGNIFICANT CHANGE UP (ref 3.5–5.3)
RBC # BLD: 4.09 M/UL — SIGNIFICANT CHANGE UP (ref 3.8–5.2)
RBC # FLD: 13.4 % — SIGNIFICANT CHANGE UP (ref 10.3–14.5)
SODIUM SERPL-SCNC: 138 MMOL/L — SIGNIFICANT CHANGE UP (ref 135–145)
WBC # BLD: 12.82 K/UL — HIGH (ref 3.8–10.5)
WBC # FLD AUTO: 12.82 K/UL — HIGH (ref 3.8–10.5)

## 2019-07-11 PROCEDURE — 99232 SBSQ HOSP IP/OBS MODERATE 35: CPT

## 2019-07-11 RX ORDER — METOCLOPRAMIDE HCL 10 MG
10 TABLET ORAL ONCE
Refills: 0 | Status: COMPLETED | OUTPATIENT
Start: 2019-07-11 | End: 2019-07-11

## 2019-07-11 RX ORDER — SODIUM CHLORIDE 9 MG/ML
1000 INJECTION, SOLUTION INTRAVENOUS
Refills: 0 | Status: DISCONTINUED | OUTPATIENT
Start: 2019-07-11 | End: 2019-07-12

## 2019-07-11 RX ADMIN — Medication 100 MILLIGRAM(S): at 06:38

## 2019-07-11 RX ADMIN — HEPARIN SODIUM 5000 UNIT(S): 5000 INJECTION INTRAVENOUS; SUBCUTANEOUS at 21:39

## 2019-07-11 RX ADMIN — Medication 400 MILLIGRAM(S): at 00:56

## 2019-07-11 RX ADMIN — HEPARIN SODIUM 5000 UNIT(S): 5000 INJECTION INTRAVENOUS; SUBCUTANEOUS at 06:38

## 2019-07-11 RX ADMIN — Medication 10 MILLIGRAM(S): at 12:12

## 2019-07-11 RX ADMIN — HEPARIN SODIUM 5000 UNIT(S): 5000 INJECTION INTRAVENOUS; SUBCUTANEOUS at 14:15

## 2019-07-11 RX ADMIN — ONDANSETRON 4 MILLIGRAM(S): 8 TABLET, FILM COATED ORAL at 09:03

## 2019-07-11 RX ADMIN — Medication 400 MILLIGRAM(S): at 12:13

## 2019-07-11 RX ADMIN — Medication 400 MILLIGRAM(S): at 18:04

## 2019-07-11 RX ADMIN — Medication 100 MILLIGRAM(S): at 14:15

## 2019-07-11 RX ADMIN — Medication 400 MILLIGRAM(S): at 06:38

## 2019-07-11 RX ADMIN — Medication 10 MILLIGRAM(S): at 06:38

## 2019-07-11 RX ADMIN — SODIUM CHLORIDE 75 MILLILITER(S): 9 INJECTION, SOLUTION INTRAVENOUS at 12:13

## 2019-07-11 RX ADMIN — Medication 100 MILLIGRAM(S): at 21:38

## 2019-07-12 ENCOUNTER — TRANSCRIPTION ENCOUNTER (OUTPATIENT)
Age: 64
End: 2019-07-12

## 2019-07-12 VITALS
RESPIRATION RATE: 17 BRPM | HEART RATE: 96 BPM | OXYGEN SATURATION: 100 % | SYSTOLIC BLOOD PRESSURE: 134 MMHG | DIASTOLIC BLOOD PRESSURE: 60 MMHG | TEMPERATURE: 98 F

## 2019-07-12 PROCEDURE — 99232 SBSQ HOSP IP/OBS MODERATE 35: CPT

## 2019-07-12 RX ORDER — DOCUSATE SODIUM 100 MG
1 CAPSULE ORAL
Qty: 0 | Refills: 0 | DISCHARGE
Start: 2019-07-12

## 2019-07-12 RX ORDER — ACETAMINOPHEN 500 MG
3 TABLET ORAL
Qty: 0 | Refills: 0 | DISCHARGE
Start: 2019-07-12

## 2019-07-12 RX ORDER — SENNA PLUS 8.6 MG/1
2 TABLET ORAL
Qty: 0 | Refills: 0 | DISCHARGE
Start: 2019-07-12

## 2019-07-12 RX ADMIN — Medication 650 MILLIGRAM(S): at 01:18

## 2019-07-12 RX ADMIN — Medication 650 MILLIGRAM(S): at 08:58

## 2019-07-12 RX ADMIN — Medication 650 MILLIGRAM(S): at 02:00

## 2019-07-12 RX ADMIN — Medication 100 MILLIGRAM(S): at 06:01

## 2019-07-12 RX ADMIN — HEPARIN SODIUM 5000 UNIT(S): 5000 INJECTION INTRAVENOUS; SUBCUTANEOUS at 06:01

## 2019-07-12 RX ADMIN — Medication 650 MILLIGRAM(S): at 09:45

## 2019-07-12 NOTE — DISCHARGE NOTE NURSING/CASE MANAGEMENT/SOCIAL WORK - NSDCPECAREGIVERED_GEN_ALL_CORE
Medline and carenotes for surgical procedure Lap Nephrectomy as well as DC Medications and side effects literature for patient reference.

## 2019-07-12 NOTE — PROGRESS NOTE ADULT - PROBLEM SELECTOR PLAN 3
s/p resection in 1985, no recurrence, in remission.
s/p resection in 1985, no recurrence, in remission.

## 2019-07-12 NOTE — PROGRESS NOTE ADULT - SUBJECTIVE AND OBJECTIVE BOX
Progress Note    Subjective  Patient seen and examined in AM. Pain controlled. Tolerating regular diet. Passing flatus. OOB/ambulating, voiding.     Objective  T(F): , Max: 98.9 (07-11-19 @ 06:35)  HR: 86  BP: 137/64  SpO2: 95%    Void: 700 cc    Gen: NAD  Abd: soft, mildly tender, non-distended, incisions c/d/i    Diet/Fluids  Reg, IV lock      Labs 7/11  WBC:  12.82  HCT: 36.6    Cr: 0.9
 Note    Post op Check    s/p : R lap partial nephx    Pt seen / examined c/o Nausea and sore throat    Vital Signs Last 24 Hrs  T(C): 36.4 (10 Jul 2019 11:30), Max: 36.5 (10 Jul 2019 06:15)  T(F): 97.5 (10 Jul 2019 11:30), Max: 97.7 (10 Jul 2019 06:15)  HR: 83 (10 Jul 2019 13:30) (60 - 83)  BP: 126/74 (10 Jul 2019 13:30) (113/60 - 146/65)  BP(mean): 86 (10 Jul 2019 13:30) (72 - 94)  RR: 16 (10 Jul 2019 13:30) (14 - 25)  SpO2: 98% (10 Jul 2019 13:30) (92% - 100%)    I&O's Summary    10 Jul 2019 07:01  -  10 Jul 2019 13:45  --------------------------------------------------------  IN: 750 mL / OUT: 70 mL / NET: 680 mL    EBL=100  Fol=70 x 3 h    PHYSICAL EXAM:       Constitutional: awake alert NAD    Respiratory: no resp distress    Cardiovascular: RRR    Gastrointestinal: softly distended, trocar sites CDI,    Genitourinary: raygoza in place, yellow urine    Extremities: + venodynes
 Progress Note    Subjective  Seen and examined in AM. Pain controlled. No GI function. Tolerating clears. Knutson removed in AM     Objective  Vital signs  T(F): , Max: 98.9 (07-11-19 @ 06:35)  HR: 79  BP: 131/62  SpO2: 94%    Output   UOP:   OUT:    Indwelling Catheter - Urethral: 1560 mL  Total OUT: 1560 mL    Gen NAD  Abd soft, NT/ND, incisions c/d/i
ANESTHESIA POSTOP CHECK    63y Female POSTOP DAY 1 S/P     Vital Signs Last 24 Hrs  T(C): 36.8 (11 Jul 2019 10:00), Max: 37.2 (11 Jul 2019 06:35)  T(F): 98.3 (11 Jul 2019 10:00), Max: 98.9 (11 Jul 2019 06:35)  HR: 73 (11 Jul 2019 10:00) (64 - 91)  BP: 126/57 (11 Jul 2019 10:00) (120/62 - 145/68)  BP(mean): 90 (10 Jul 2019 17:00) (75 - 91)  RR: 16 (11 Jul 2019 10:00) (16 - 21)  SpO2: 95% (11 Jul 2019 10:00) (94% - 100%)  I&O's Summary    10 Jul 2019 07:01  -  11 Jul 2019 07:00  --------------------------------------------------------  IN: 1375 mL / OUT: 1560 mL / NET: -185 mL    11 Jul 2019 07:01  -  11 Jul 2019 12:36  --------------------------------------------------------  IN: 0 mL / OUT: 200 mL / NET: -200 mL        [x ] NO APPARENT ANESTHESIA COMPLICATIONS      Comments:
Elizabeth Pradhan MD  Pager [phone]    CHIEF COMPLAINT: Patient is a 63y old  female who presents with a chief complaint of R renal mass (10 Jul 2019 13:37)      SUBJECTIVE / OVERNIGHT EVENTS:  pt feels ok, pain controlled, c/o nausea, no flatus , no chest pain/sob/dizziness    MEDICATIONS  (STANDING):  acetaminophen  IVPB .. 1000 milliGRAM(s) IV Intermittent once  dextrose 5% + sodium chloride 0.45%. 1000 milliLiter(s) (75 mL/Hr) IV Continuous <Continuous>  docusate sodium 100 milliGRAM(s) Oral three times a day  heparin  Injectable 5000 Unit(s) SubCutaneous every 8 hours    MEDICATIONS  (PRN):  acetaminophen   Tablet .. 650 milliGRAM(s) Oral every 6 hours PRN Mild Pain (1 - 3)  ondansetron Injectable 4 milliGRAM(s) IV Push every 6 hours PRN Nausea and/or Vomiting  senna 2 Tablet(s) Oral at bedtime PRN Constipation      VITALS:  T(F): 98.3 (07-11-19 @ 10:00), Max: 98.9 (07-11-19 @ 06:35)  HR: 73 (07-11-19 @ 10:00) (64 - 91)  BP: 126/57 (07-11-19 @ 10:00) (120/62 - 145/68)  RR: 16 (07-11-19 @ 10:00) (16 - 21)  SpO2: 95% (07-11-19 @ 10:00)      CAPILLARY BLOOD GLUCOSE    Output     I&O's Summary  T(F): 98.3 (07-11-19 @ 10:00), Max: 98.9 (07-11-19 @ 06:35)  HR: 73 (07-11-19 @ 10:00) (64 - 91)  BP: 126/57 (07-11-19 @ 10:00) (120/62 - 145/68)  RR: 16 (07-11-19 @ 10:00) (16 - 21)  SpO2: 95% (07-11-19 @ 10:00)    PHYSICAL EXAM:  GENERAL: NAD, well-developed  HEAD:  Atraumatic, Normocephalic  EYES: EOMI, conjunctiva and sclera clear  NECK: Supple, No JVD  CHEST/LUNG: Clear to auscultation bilaterally; No wheeze  HEART: Regular rate and rhythm; No murmurs, rubs, or gallops  ABDOMEN: Soft, incisional tenderness, Nondistended; Bowel sounds present  : raygoza  EXTREMITIES:  2+ Peripheral Pulses, No clubbing, cyanosis, or edema  PSYCH: AAOx3  NEUROLOGY: non-focal  SKIN: No rashes or lesions    LABS:              12.0                 138  | 26   | 15           12.82 >-----------< 213     ------------------------< 101                   36.6                 4.3  | 104  | 0.93                                         Ca 8.9   Mg x     Ph x          MICROBIOLOGY:    RADIOLOGY & ADDITIONAL TESTS:    Imaging Personally Reviewed:    [ ] Consultant(s) Notes Reviewed:  [ x] Care Discussed with Consultants/Other Providers: urology ALBERT España, breathing well, await GI function
Elizabeth Pradhan MD  Pager [phone]    CHIEF COMPLAINT: Patient is a 63y old  female who presents with a chief complaint of Partial removal of right kidney (12 Jul 2019 11:38)      SUBJECTIVE / OVERNIGHT EVENTS:  pt doing well, denies chest pain/sob/dizziness, passed flatus/bm, tolerating diet, going home today    MEDICATIONS  (STANDING):  docusate sodium 100 milliGRAM(s) Oral three times a day  heparin  Injectable 5000 Unit(s) SubCutaneous every 8 hours    MEDICATIONS  (PRN):  acetaminophen   Tablet .. 650 milliGRAM(s) Oral every 6 hours PRN Mild Pain (1 - 3)  ondansetron Injectable 4 milliGRAM(s) IV Push every 6 hours PRN Nausea and/or Vomiting  senna 2 Tablet(s) Oral at bedtime PRN Constipation      VITALS:  T(F): 98.1 (07-12-19 @ 08:55), Max: 98.8 (07-12-19 @ 06:00)  HR: 96 (07-12-19 @ 08:55) (78 - 96)  BP: 134/60 (07-12-19 @ 08:55) (125/62 - 152/84)  RR: 17 (07-12-19 @ 08:55) (16 - 17)  SpO2: 100% (07-12-19 @ 08:55)      CAPILLARY BLOOD GLUCOSE    Output     I&O's Summary  T(F): 98.1 (07-12-19 @ 08:55), Max: 98.8 (07-12-19 @ 06:00)  HR: 96 (07-12-19 @ 08:55) (78 - 96)  BP: 134/60 (07-12-19 @ 08:55) (125/62 - 152/84)  RR: 17 (07-12-19 @ 08:55) (16 - 17)  SpO2: 100% (07-12-19 @ 08:55)    PHYSICAL EXAM:  GENERAL: NAD, well-developed  HEAD:  Atraumatic, Normocephalic  EYES: EOMI, conjunctiva and sclera clear  NECK: Supple, No JVD  CHEST/LUNG: slight inspiratory wheeze, otherwise clear   HEART: Regular rate and rhythm; No murmurs, rubs, or gallops  ABDOMEN: Soft, incisional tenderness, Nondistended; Bowel sounds present  EXTREMITIES:  2+ Peripheral Pulses, No clubbing, cyanosis, or edema  PSYCH: AAOx3  NEUROLOGY: non-focal  SKIN: No rashes or lesions    LABS:              12.0                 138  | 26   | 15           12.82 >-----------< 213     ------------------------< 101                   36.6                 4.3  | 104  | 0.93                                         Ca 8.9   Mg x     Ph x            MICROBIOLOGY:    RADIOLOGY & ADDITIONAL TESTS:    Imaging Personally Reviewed:    [ ] Consultant(s) Notes Reviewed:  [x ] Care Discussed with Consultants/Other Providers: urology ALBERT España, doing well, d/c home today

## 2019-07-12 NOTE — DISCHARGE NOTE NURSING/CASE MANAGEMENT/SOCIAL WORK - NSDCFUADDAPPT_GEN_ALL_CORE_FT
follow-up with Dr. Dinero at the office for post-op visit as instructed., as well as with PMD for continuity of care.

## 2019-07-12 NOTE — DISCHARGE NOTE NURSING/CASE MANAGEMENT/SOCIAL WORK - NSDCPEWEB_GEN_ALL_CORE
[place] website --- [email or web address]/[email or web address] website --- [email or web address]

## 2019-07-12 NOTE — DISCHARGE NOTE NURSING/CASE MANAGEMENT/SOCIAL WORK - NSDCDPATPORTLINK_GEN_ALL_CORE
You can access the [facility] Patient Portal, offered by [facility], by registering with the following website: [email or web address] Cancer [email or web address]

## 2019-07-12 NOTE — DISCHARGE NOTE PROVIDER - CARE PROVIDER_API CALL
Joel Dinero; SHERLY)  Urology  [address] [place] [address]  Phone: [phone]  Fax: [phone]  Follow Up Time:

## 2019-07-12 NOTE — DISCHARGE NOTE PROVIDER - HOSPITAL COURSE
64 yo F underwent uncomplicated right lap partial nephrectomy on 7/10/19.  Postoperative course uneventful, successful TOV on POD #1,  pain controlled, ambulating.  Return of GI function late on POD #1, diet advanced without incident.   Pt d/c on POD #2 to f/u with Dr. Dinero.

## 2019-07-12 NOTE — DISCHARGE NOTE NURSING/CASE MANAGEMENT/SOCIAL WORK - NSDPDISTO_GEN_ALL_CORE
Home/Pt  with incisions CDI steri strips to scope sites, VS stable Afebrile. pt with positive bowel sounds bella po diet. Voiding without difficulty. Home/Pt  with incisions CDI steri strips to scope sites, VS stable Afebrile. pt with positive bowel sounds bella po diet. Voiding without difficulty. Seen by MD and cleared for DC to Rehab as per safe DC plan.

## 2019-07-12 NOTE — DISCHARGE NOTE PROVIDER - NSDCCPCAREPLAN_GEN_ALL_CORE_FT
PRINCIPAL DISCHARGE DIAGNOSIS  Diagnosis: Right renal mass  Assessment and Plan of Treatment: Drink plenty of fluids.  No heavy lifting (greater than 10 pounds) or straining for 4 to 6 weeks.  You may shower, just pat white strips dry, they will fall off in a few weeks.   Call Dr. Dinero's office  to schedule a follow up appointment.  Call the office if you have fever greater than 101, difficulty urinating, pain not relieved with pain medication, nausea/vomiting.

## 2019-07-12 NOTE — PROGRESS NOTE ADULT - PROBLEM SELECTOR PLAN 4
resume vit d supplement on discharge,  outpt f/u PCP.
hold vit D supplement while inpt, outpt f/u PCP.

## 2019-07-12 NOTE — PROGRESS NOTE ADULT - PROBLEM SELECTOR PLAN 2
-h/o vocal cord cancer s/p resection in 1985, upper airway disease likely related to surgery (?scar tissue) at that time  -no significant residual effect now, slight inspiratory wheeze on exam, sating well
-h/o vocal cord cancer s/p resection in 1985, upper airway disease likely related to surgery (?scar tissue) at that time  -no significant residual effect now, sating well, lung clear, monitor sats, incentive spirometry.

## 2019-07-12 NOTE — DISCHARGE NOTE NURSING/CASE MANAGEMENT/SOCIAL WORK - NSDCPEEMAIL_GEN_ALL_CORE
[facility] email [email or web address]

## 2019-07-12 NOTE — PROGRESS NOTE ADULT - ASSESSMENT
63F POD # 2 s/p right lap partial nephrectomy  -No labs   -Monitor GI function, cont regular diet   -OOB/amb, pain control  -D/C home today
63F POD # 1 s/p right lap partial nephrectomy  -AM labs  -GI function, cont clears   -AM dulcolax  -OOB/amb  -TOV
63F with h/o varicose vein ablation, vocal cord cancer s/p resection in 1985 (no recurrence) c/b restrictive airway disease at that time, vit D deficiency, incidental right renal mass found on CT scan, s/p right lap partial nephrectomy on 7/10/19.
63y female s/p R lap part nephx
63F with h/o varicose vein ablation, vocal cord cancer s/p resection in 1985 (no recurrence) c/b restrictive airway disease at that time, vit D deficiency, incidental right renal mass found on CT scan, s/p right lap partial nephrectomy on 7/10/19.

## 2019-07-16 ENCOUNTER — MESSAGE (OUTPATIENT)
Age: 64
End: 2019-07-16

## 2019-07-19 PROBLEM — I83.90 ASYMPTOMATIC VARICOSE VEINS OF UNSPECIFIED LOWER EXTREMITY: Chronic | Status: ACTIVE | Noted: 2019-06-24

## 2019-07-19 PROBLEM — J98.4 OTHER DISORDERS OF LUNG: Chronic | Status: ACTIVE | Noted: 2019-06-24

## 2019-07-19 PROBLEM — C32.0 MALIGNANT NEOPLASM OF GLOTTIS: Chronic | Status: ACTIVE | Noted: 2019-06-24

## 2019-07-19 PROBLEM — N28.89 OTHER SPECIFIED DISORDERS OF KIDNEY AND URETER: Chronic | Status: ACTIVE | Noted: 2019-06-24

## 2019-07-19 LAB — SURGICAL PATHOLOGY STUDY: SIGNIFICANT CHANGE UP

## 2019-08-05 ENCOUNTER — APPOINTMENT (OUTPATIENT)
Dept: UROLOGY | Facility: CLINIC | Age: 64
End: 2019-08-05
Payer: COMMERCIAL

## 2019-08-05 PROCEDURE — 99024 POSTOP FOLLOW-UP VISIT: CPT

## 2019-08-05 NOTE — HISTORY OF PRESENT ILLNESS
[None] : no symptoms [FreeTextEntry1] : Right renal mass noted on CT incidentally . Right partial nephrectomy for RCC FG 2 .

## 2019-08-05 NOTE — PHYSICAL EXAM
[General Appearance - Well Developed] : well developed [General Appearance - Well Nourished] : well nourished [Abdomen Soft] : soft [FreeTextEntry1] : steri strips removed  [Skin Color & Pigmentation] : normal skin color and pigmentation [Heart Rate And Rhythm] : Heart rate and rhythm were normal [Skin Turgor] : supple [] : no respiratory distress [Oriented To Time, Place, And Person] : oriented to person, place, and time [Normal Station and Gait] : the gait and station were normal for the patient's age [No Focal Deficits] : no focal deficits [No Palpable Adenopathy] : no palpable adenopathy

## 2019-08-05 NOTE — REVIEW OF SYSTEMS
[Feeling Poorly] : feeling poorly [Feeling Tired] : feeling tired [see HPI] : see HPI [Negative] : Endocrine

## 2019-08-05 NOTE — ASSESSMENT
[FreeTextEntry1] : Doing well .Wounds healing . The lower abdominal incision with small amount of eschar . Silver nitrate applied . Reinforced lifting restrictions . Encourage hydration . She is dieting and has lost 32 lbs since March

## 2019-08-30 ENCOUNTER — RX RENEWAL (OUTPATIENT)
Age: 64
End: 2019-08-30

## 2019-08-30 ENCOUNTER — APPOINTMENT (OUTPATIENT)
Dept: UROLOGY | Facility: CLINIC | Age: 64
End: 2019-08-30

## 2019-09-16 NOTE — PROGRESS NOTE ADULT - PROBLEM SELECTOR PLAN 1
-s/p right lap partial nephrectomy 7/10/19  -progressing well postop, tolerating diet, pain control, dvt ppx.  -leukocytosis postop likely reactive  -d/c plan home today per primary team
ANTICOAGULATION FOLLOW-UP CLINIC VISIT    Patient Name:  Gerri Owens  Date:  9/16/2019  Contact Type:  Face to Face    SUBJECTIVE:  Patient Findings     Comments:   Has 2 more days of Cipro.         Clinical Outcomes     Negatives:   Major bleeding event, Thromboembolic event, Anticoagulation-related hospital admission, Anticoagulation-related ED visit, Anticoagulation-related fatality    Comments:   Has 2 more days of Cipro.            OBJECTIVE    INR Protime   Date Value Ref Range Status   09/16/2019 3.0 (A) 0.86 - 1.14 Final       ASSESSMENT / PLAN  INR assessment THER    Recheck INR In: 1 WEEK    INR Location Clinic      Anticoagulation Summary  As of 9/16/2019    INR goal:   2.0-3.0   TTR:   44.8 % (9.5 mo)   INR used for dosing:   3.0 (9/16/2019)   Warfarin maintenance plan:   7.5 mg (5 mg x 1.5) every Sun, Tue, Thu; 5 mg (5 mg x 1) all other days   Full warfarin instructions:   9/16: 2.5 mg; 9/17: 5 mg; Otherwise 7.5 mg every Sun, Tue, Thu; 5 mg all other days   Weekly warfarin total:   42.5 mg   Plan last modified:   Varsha Crowe, RN (3/13/2019)   Next INR check:   9/23/2019   Target end date:       Indications    DVT of upper extremity (deep vein thrombosis) (H) (Resolved) [I82.629]  Pulmonary embolus (H) [I26.99]             Anticoagulation Episode Summary     INR check location:       Preferred lab:       Send INR reminders to:   ARIANAN KIMBLE    Comments:   MTV 30 mcg. Consistent spinach and broccoli intake. DVT subclavian vein 2012. PE 7/2018. Brother positive Factor V Leiden. Another brother on lifetime AC. 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       Anticoagulation Care Providers     Provider Role Specialty Phone number    Karo Doty MD [facility] [phone]            See the Encounter Report to view Anticoagulation Flowsheet and Dosing Calendar (Go to Encounters tab in chart review, and find the Anticoagulation Therapy Visit)        Tena Castillo, 
RN                 
-s/p right lap partial nephrectomy 7/10/19  -postop management per urology, await GI fxn, pain control, postop lab, IVF, incentive spirometry, dvt ppx.  -leukocytosis postop likely reactive
Strict I&O's, s/p bolus x 1 will monitor low urine output  Analgesia Antiemetics  DVT prophylaxis  Incentive spirometry  Clears / OOB  AM labs

## 2019-09-30 ENCOUNTER — RX RENEWAL (OUTPATIENT)
Age: 64
End: 2019-09-30

## 2019-11-04 ENCOUNTER — RX RENEWAL (OUTPATIENT)
Age: 64
End: 2019-11-04

## 2020-02-07 ENCOUNTER — APPOINTMENT (OUTPATIENT)
Dept: UROLOGY | Facility: CLINIC | Age: 65
End: 2020-02-07
Payer: MEDICARE

## 2020-02-07 ENCOUNTER — OUTPATIENT (OUTPATIENT)
Dept: OUTPATIENT SERVICES | Facility: HOSPITAL | Age: 65
LOS: 1 days | End: 2020-02-07
Payer: MEDICARE

## 2020-02-07 ENCOUNTER — APPOINTMENT (OUTPATIENT)
Dept: UROLOGY | Facility: CLINIC | Age: 65
End: 2020-02-07

## 2020-02-07 VITALS
HEART RATE: 50 BPM | WEIGHT: 170 LBS | RESPIRATION RATE: 17 BRPM | TEMPERATURE: 98 F | DIASTOLIC BLOOD PRESSURE: 65 MMHG | SYSTOLIC BLOOD PRESSURE: 165 MMHG | BODY MASS INDEX: 33.38 KG/M2 | HEIGHT: 60 IN

## 2020-02-07 VITALS
HEIGHT: 71 IN | BODY MASS INDEX: 26.04 KG/M2 | RESPIRATION RATE: 17 BRPM | SYSTOLIC BLOOD PRESSURE: 144 MMHG | DIASTOLIC BLOOD PRESSURE: 85 MMHG | HEART RATE: 67 BPM | TEMPERATURE: 97.7 F | WEIGHT: 186 LBS

## 2020-02-07 DIAGNOSIS — Z90.02 ACQUIRED ABSENCE OF LARYNX: Chronic | ICD-10-CM

## 2020-02-07 DIAGNOSIS — R35.0 FREQUENCY OF MICTURITION: ICD-10-CM

## 2020-02-07 DIAGNOSIS — C64.9 MALIGNANT NEOPLASM OF UNSPECIFIED KIDNEY, EXCEPT RENAL PELVIS: ICD-10-CM

## 2020-02-07 DIAGNOSIS — Z98.890 OTHER SPECIFIED POSTPROCEDURAL STATES: Chronic | ICD-10-CM

## 2020-02-07 PROCEDURE — 99214 OFFICE O/P EST MOD 30 MIN: CPT | Mod: 25

## 2020-02-07 PROCEDURE — 76775 US EXAM ABDO BACK WALL LIM: CPT

## 2020-02-07 PROCEDURE — 76775 US EXAM ABDO BACK WALL LIM: CPT | Mod: 26

## 2020-02-07 NOTE — HISTORY OF PRESENT ILLNESS
[FreeTextEntry1] : Right partial nephrectomy for RCC FG 2 July 2019. No interval events since prior post op check. Doing well without hematuria. \par \par Reviewed ultrasound which is negative

## 2020-02-07 NOTE — PHYSICAL EXAM
[Abdomen Soft] : soft [Well Groomed] : well groomed [Normal Appearance] : normal appearance [] : no rash [Abdomen Tenderness] : non-tender [Edema] : no peripheral edema [Exaggerated Use Of Accessory Muscles For Inspiration] : no accessory muscle use [Affect] : the affect was normal [Normal Station and Gait] : the gait and station were normal for the patient's age [No Focal Deficits] : no focal deficits

## 2020-02-07 NOTE — ASSESSMENT
[FreeTextEntry1] : 65 yo female s/p right partial nephrectomy (July 2019) pT1a FG2. Ultrasound today negative.

## 2020-03-25 ENCOUNTER — APPOINTMENT (OUTPATIENT)
Dept: VASCULAR SURGERY | Facility: CLINIC | Age: 65
End: 2020-03-25

## 2020-10-01 DIAGNOSIS — E55.9 VITAMIN D DEFICIENCY, UNSPECIFIED: ICD-10-CM

## 2020-10-01 DIAGNOSIS — M85.80 OTHER SPECIFIED DISORDERS OF BONE DENSITY AND STRUCTURE, UNSPECIFIED SITE: ICD-10-CM

## 2020-10-23 ENCOUNTER — APPOINTMENT (OUTPATIENT)
Dept: INTERNAL MEDICINE | Facility: CLINIC | Age: 65
End: 2020-10-23
Payer: MEDICARE

## 2020-10-23 ENCOUNTER — NON-APPOINTMENT (OUTPATIENT)
Age: 65
End: 2020-10-23

## 2020-10-23 VITALS
HEART RATE: 71 BPM | HEIGHT: 60 IN | RESPIRATION RATE: 17 BRPM | WEIGHT: 177 LBS | OXYGEN SATURATION: 98 % | BODY MASS INDEX: 34.75 KG/M2 | DIASTOLIC BLOOD PRESSURE: 70 MMHG | TEMPERATURE: 97.5 F | SYSTOLIC BLOOD PRESSURE: 112 MMHG

## 2020-10-23 VITALS — SYSTOLIC BLOOD PRESSURE: 110 MMHG | DIASTOLIC BLOOD PRESSURE: 70 MMHG

## 2020-10-23 DIAGNOSIS — Z23 ENCOUNTER FOR IMMUNIZATION: ICD-10-CM

## 2020-10-23 DIAGNOSIS — Z20.828 CONTACT WITH AND (SUSPECTED) EXPOSURE TO OTHER VIRAL COMMUNICABLE DISEASES: ICD-10-CM

## 2020-10-23 DIAGNOSIS — M79.89 OTHER SPECIFIED SOFT TISSUE DISORDERS: ICD-10-CM

## 2020-10-23 DIAGNOSIS — Z11.59 ENCOUNTER FOR SCREENING FOR OTHER VIRAL DISEASES: ICD-10-CM

## 2020-10-23 PROCEDURE — G0402 INITIAL PREVENTIVE EXAM: CPT

## 2020-10-23 PROCEDURE — G0009: CPT

## 2020-10-23 PROCEDURE — 36415 COLL VENOUS BLD VENIPUNCTURE: CPT

## 2020-10-23 PROCEDURE — G0008: CPT

## 2020-10-23 PROCEDURE — 90670 PCV13 VACCINE IM: CPT

## 2020-10-23 PROCEDURE — G0403: CPT

## 2020-10-23 PROCEDURE — 90662 IIV NO PRSV INCREASED AG IM: CPT

## 2020-10-23 RX ORDER — ERGOCALCIFEROL 1.25 MG/1
1.25 MG CAPSULE ORAL
Qty: 12 | Refills: 1 | Status: DISCONTINUED | COMMUNITY
Start: 2019-01-27 | End: 2020-10-23

## 2020-10-23 RX ORDER — ERGOCALCIFEROL 1.25 MG/1
1.25 MG CAPSULE, LIQUID FILLED ORAL
Qty: 4 | Refills: 0 | Status: DISCONTINUED | COMMUNITY
Start: 2019-08-30 | End: 2020-10-23

## 2020-10-23 NOTE — REVIEW OF SYSTEMS
[Recent Change In Weight] : ~T recent weight change [Negative] : Heme/Lymph [Fever] : no fever [Chills] : no chills

## 2020-10-23 NOTE — HEALTH RISK ASSESSMENT
[0] : 2) Feeling down, depressed, or hopeless: Not at all (0) [With Family] : lives with family [Retired] : retired [] :  [# Of Children ___] : has [unfilled] children [Patient reported PAP Smear was normal] : Patient reported PAP Smear was normal [Patient declined colonoscopy] : Patient declined colonoscopy [Hepatitis C test offered] : Hepatitis C test offered [Fully functional (bathing, dressing, toileting, transferring, walking, feeding)] : Fully functional (bathing, dressing, toileting, transferring, walking, feeding) [Fully functional (using the telephone, shopping, preparing meals, housekeeping, doing laundry, using] : Fully functional and needs no help or supervision to perform IADLs (using the telephone, shopping, preparing meals, housekeeping, doing laundry, using transportation, managing medications and managing finances) [] : No [de-identified] : former [de-identified] : rare [[id number]] : 0 [Reports changes in hearing] : Reports no changes in hearing [Reports changes in vision] : Reports no changes in vision [Reports changes in dental health] : Reports no changes in dental health [PapSmearDate] : 01/18 [FreeTextEntry2] :

## 2020-10-23 NOTE — PHYSICAL EXAM
[No Acute Distress] : no acute distress [Well Nourished] : well nourished [Well Developed] : well developed [Well-Appearing] : well-appearing [Normal Sclera/Conjunctiva] : normal sclera/conjunctiva [PERRL] : pupils equal round and reactive to light [EOMI] : extraocular movements intact [Normal Outer Ear/Nose] : the outer ears and nose were normal in appearance [No JVD] : no jugular venous distention [No Lymphadenopathy] : no lymphadenopathy [Supple] : supple [Thyroid Normal, No Nodules] : the thyroid was normal and there were no nodules present [No Respiratory Distress] : no respiratory distress  [No Accessory Muscle Use] : no accessory muscle use [Clear to Auscultation] : lungs were clear to auscultation bilaterally [Normal Rate] : normal rate  [Regular Rhythm] : with a regular rhythm [Normal S1, S2] : normal S1 and S2 [No Murmur] : no murmur heard [No Carotid Bruits] : no carotid bruits [No Varicosities] : no varicosities [Pedal Pulses Present] : the pedal pulses are present [No Extremity Clubbing/Cyanosis] : no extremity clubbing/cyanosis [Normal Appearance] : normal in appearance [No Nipple Discharge] : no nipple discharge [No Axillary Lymphadenopathy] : no axillary lymphadenopathy [Soft] : abdomen soft [Non Tender] : non-tender [Non-distended] : non-distended [No Masses] : no abdominal mass palpated [No HSM] : no HSM [Normal Bowel Sounds] : normal bowel sounds [Normal Posterior Cervical Nodes] : no posterior cervical lymphadenopathy [Normal Anterior Cervical Nodes] : no anterior cervical lymphadenopathy [No CVA Tenderness] : no CVA  tenderness [No Spinal Tenderness] : no spinal tenderness [No Joint Swelling] : no joint swelling [Grossly Normal Strength/Tone] : grossly normal strength/tone [No Rash] : no rash [Coordination Grossly Intact] : coordination grossly intact [No Focal Deficits] : no focal deficits [Normal Gait] : normal gait [Normal Affect] : the affect was normal [Alert and Oriented x3] : oriented to person, place, and time [Normal Insight/Judgement] : insight and judgment were intact [de-identified] : b/l emmie [de-identified] : ankle edema

## 2020-10-23 NOTE — PLAN
[FreeTextEntry1] : Depression screening negative \par BP is controlled\par labs reviewed with patient. LDL much higher. She is surprised as her diet is very healthy. 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 Will also check Hep C\par start OTC vit D\par Flu shot and prevnar given today. no immediate reaction\par She has an appointment for she shingles vaccine\par Given Rx for mammo and dexa\par Continue exercise\par f/u 4-6 months

## 2020-10-23 NOTE — HISTORY OF PRESENT ILLNESS
[FreeTextEntry1] : Annual physical [de-identified] : 63 y/o female with remote h/o laryngeal cancer with airway stricture, kidney cancer s/p partial nephrectomy, chronic LE edema who presents for an annual physical. She has lost 60 pounds.  Rides stationary bike 5-7 days per week\par diet is healthy. does not drink calories. If she has any alcohol its vodka and not wine since wine turns to sugar\par pap 2018\par mammo 12/18\par colonoscopy never \par skin exam 2019\par

## 2020-10-23 NOTE — HISTORY OF PRESENT ILLNESS
[FreeTextEntry1] : Annual physical [de-identified] : 65 y/o female with remote h/o laryngeal cancer with airway stricture, kidney cancer s/p partial nephrectomy, chronic LE edema who presents for an annual physical. She has lost 60 pounds.  Rides stationary bike 5-7 days per week\par diet is healthy. does not drink calories. If she has any alcohol its vodka and not wine since wine turns to sugar\par pap 2018\par mammo 12/18\par colonoscopy never \par skin exam 2019\par

## 2020-10-23 NOTE — PHYSICAL EXAM
[No Acute Distress] : no acute distress [Well Nourished] : well nourished [Well Developed] : well developed [Well-Appearing] : well-appearing [Normal Sclera/Conjunctiva] : normal sclera/conjunctiva [PERRL] : pupils equal round and reactive to light [EOMI] : extraocular movements intact [Normal Outer Ear/Nose] : the outer ears and nose were normal in appearance [No JVD] : no jugular venous distention [No Lymphadenopathy] : no lymphadenopathy [Supple] : supple [Thyroid Normal, No Nodules] : the thyroid was normal and there were no nodules present [No Respiratory Distress] : no respiratory distress  [No Accessory Muscle Use] : no accessory muscle use [Clear to Auscultation] : lungs were clear to auscultation bilaterally [Normal Rate] : normal rate  [Regular Rhythm] : with a regular rhythm [Normal S1, S2] : normal S1 and S2 [No Murmur] : no murmur heard [No Carotid Bruits] : no carotid bruits [No Varicosities] : no varicosities [Pedal Pulses Present] : the pedal pulses are present [No Extremity Clubbing/Cyanosis] : no extremity clubbing/cyanosis [Normal Appearance] : normal in appearance [No Nipple Discharge] : no nipple discharge [No Axillary Lymphadenopathy] : no axillary lymphadenopathy [Soft] : abdomen soft [Non Tender] : non-tender [Non-distended] : non-distended [No Masses] : no abdominal mass palpated [No HSM] : no HSM [Normal Bowel Sounds] : normal bowel sounds [Normal Posterior Cervical Nodes] : no posterior cervical lymphadenopathy [Normal Anterior Cervical Nodes] : no anterior cervical lymphadenopathy [No CVA Tenderness] : no CVA  tenderness [No Spinal Tenderness] : no spinal tenderness [No Joint Swelling] : no joint swelling [Grossly Normal Strength/Tone] : grossly normal strength/tone [No Rash] : no rash [Coordination Grossly Intact] : coordination grossly intact [No Focal Deficits] : no focal deficits [Normal Gait] : normal gait [Normal Affect] : the affect was normal [Alert and Oriented x3] : oriented to person, place, and time [Normal Insight/Judgement] : insight and judgment were intact [de-identified] : b/l emmie [de-identified] : ankle edema

## 2020-10-23 NOTE — HEALTH RISK ASSESSMENT
[0] : 2) Feeling down, depressed, or hopeless: Not at all (0) [With Family] : lives with family [Retired] : retired [] :  [# Of Children ___] : has [unfilled] children [Patient reported PAP Smear was normal] : Patient reported PAP Smear was normal [Patient declined colonoscopy] : Patient declined colonoscopy [Hepatitis C test offered] : Hepatitis C test offered [Fully functional (bathing, dressing, toileting, transferring, walking, feeding)] : Fully functional (bathing, dressing, toileting, transferring, walking, feeding) [Fully functional (using the telephone, shopping, preparing meals, housekeeping, doing laundry, using] : Fully functional and needs no help or supervision to perform IADLs (using the telephone, shopping, preparing meals, housekeeping, doing laundry, using transportation, managing medications and managing finances) [] : No [de-identified] : former [de-identified] : rare [[id number]] : 0 [Reports changes in hearing] : Reports no changes in hearing [Reports changes in vision] : Reports no changes in vision [Reports changes in dental health] : Reports no changes in dental health [PapSmearDate] : 01/18 [FreeTextEntry2] :

## 2020-10-27 ENCOUNTER — TRANSCRIPTION ENCOUNTER (OUTPATIENT)
Age: 65
End: 2020-10-27

## 2020-10-27 LAB
HCV AB SER QL: NONREACTIVE
HCV S/CO RATIO: 0.06 S/CO
SARS-COV-2 IGG SERPL IA-ACNC: 0.1 INDEX
SARS-COV-2 IGG SERPL QL IA: NEGATIVE

## 2020-10-29 ENCOUNTER — NON-APPOINTMENT (OUTPATIENT)
Age: 65
End: 2020-10-29

## 2021-01-20 ENCOUNTER — NON-APPOINTMENT (OUTPATIENT)
Age: 66
End: 2021-01-20

## 2021-03-01 ENCOUNTER — TRANSCRIPTION ENCOUNTER (OUTPATIENT)
Age: 66
End: 2021-03-01

## 2021-04-09 ENCOUNTER — OUTPATIENT (OUTPATIENT)
Dept: OUTPATIENT SERVICES | Facility: HOSPITAL | Age: 66
LOS: 1 days | End: 2021-04-09
Payer: MEDICARE

## 2021-04-09 ENCOUNTER — APPOINTMENT (OUTPATIENT)
Dept: UROLOGY | Facility: CLINIC | Age: 66
End: 2021-04-09
Payer: MEDICARE

## 2021-04-09 VITALS
RESPIRATION RATE: 17 BRPM | SYSTOLIC BLOOD PRESSURE: 162 MMHG | BODY MASS INDEX: 24.78 KG/M2 | TEMPERATURE: 98 F | WEIGHT: 177 LBS | HEART RATE: 79 BPM | DIASTOLIC BLOOD PRESSURE: 90 MMHG | HEIGHT: 71 IN

## 2021-04-09 DIAGNOSIS — Z98.890 OTHER SPECIFIED POSTPROCEDURAL STATES: Chronic | ICD-10-CM

## 2021-04-09 DIAGNOSIS — Z90.02 ACQUIRED ABSENCE OF LARYNX: Chronic | ICD-10-CM

## 2021-04-09 DIAGNOSIS — R35.0 FREQUENCY OF MICTURITION: ICD-10-CM

## 2021-04-09 PROCEDURE — 76775 US EXAM ABDO BACK WALL LIM: CPT

## 2021-04-09 PROCEDURE — 76775 US EXAM ABDO BACK WALL LIM: CPT | Mod: 26

## 2021-04-09 PROCEDURE — 99214 OFFICE O/P EST MOD 30 MIN: CPT

## 2021-04-09 NOTE — HISTORY OF PRESENT ILLNESS
[None] : no symptoms [FreeTextEntry1] : Right renal mass noted in 2019 . Right partial nephrectomy for pT1a FG 2  She has no complaints

## 2021-04-09 NOTE — ASSESSMENT
[FreeTextEntry1] : Renal ultrasound done  Discussed ultrasound.She has some lower leg edema .Recommend decrease salt intake

## 2021-04-14 DIAGNOSIS — C64.9 MALIGNANT NEOPLASM OF UNSPECIFIED KIDNEY, EXCEPT RENAL PELVIS: ICD-10-CM

## 2021-04-14 DIAGNOSIS — C65.9 MALIGNANT NEOPLASM OF UNSPECIFIED RENAL PELVIS: ICD-10-CM

## 2021-05-13 ENCOUNTER — APPOINTMENT (OUTPATIENT)
Dept: INTERNAL MEDICINE | Facility: CLINIC | Age: 66
End: 2021-05-13
Payer: MEDICARE

## 2021-05-13 ENCOUNTER — NON-APPOINTMENT (OUTPATIENT)
Age: 66
End: 2021-05-13

## 2021-05-13 VITALS
WEIGHT: 179 LBS | BODY MASS INDEX: 25.06 KG/M2 | HEIGHT: 71 IN | DIASTOLIC BLOOD PRESSURE: 80 MMHG | SYSTOLIC BLOOD PRESSURE: 120 MMHG | HEART RATE: 91 BPM | OXYGEN SATURATION: 97 % | TEMPERATURE: 97.6 F | RESPIRATION RATE: 16 BRPM

## 2021-05-13 DIAGNOSIS — R25.2 CRAMP AND SPASM: ICD-10-CM

## 2021-05-13 DIAGNOSIS — R60.0 LOCALIZED EDEMA: ICD-10-CM

## 2021-05-13 PROCEDURE — 99214 OFFICE O/P EST MOD 30 MIN: CPT | Mod: 25

## 2021-05-13 PROCEDURE — 36415 COLL VENOUS BLD VENIPUNCTURE: CPT

## 2021-05-13 NOTE — HEALTH RISK ASSESSMENT
SUBJECTIVE:     -Remains HFNC, now 40L/60%  -Now NPO except meds due to dysphagia concerns    Hospital Course    Patient is a 68-year-old female with past medical history significant for rheumatoid arthritis, Sjogren's syndrome, pancreatic cancer (status post Whipple 10 years ago), hypertension, abdominal wall hernia (with abdominal wall binder), IBS who presented to [facility] initially on 6/10 for upper abdominal pain 4 days prior to admission associated with cough and dyspnea on exertion.  The etiology of her acute hypoxic respiratory failure is unclear.  Initial plan was for patient to tentatively undergo bronchoscopy.  However, that will be delayed pending infectious work-up.      Found to be fungitell positive. Started on ambisome and bactrim (treatment dose).    06/10: cough, PATE, upper abdominal pain. CXR with b/l opacities. CTAP with b/l opacities. Given ceftriaxone and doxy. Covid negatie  06/13: Pulmonary consult: repeat COVID negative. RVP negative. Plan for possible bronch. ID consult: ceftriaxone and azithromycine  -06/14: Desat on 6L NC -> WM 12L -> PRM 15L  -06/15: lasix x1. Fungal workup. Antibiotics modified to cefepime  -06/16: Lasix 40 IV x 1. Fluconazole therapy started for thrush.  -06/17: RRT for desat -> NRM 15L -> HFNC -> 40mg IV solumedrol + lasix 20IV x 1 -> transferred to ICU.   -06/18: Vitamin K 10 x1 for coagulopathy. Fungitell positive. Therapeutic bactrim started for PJP treatment.  -06/19: -Completed 8 days cefepime.   -06/20: NPO except meds after swallow eval.    OBJECTIVE     I/O's    Intake/Output Summary (Last 24 hours) at 6/21/2020 0789  Last data filed at 6/21/2020 0359  Gross per 24 hour   Intake 455.02 ml   Output 2320 ml   Net -1864.98 ml       Last Recorded Vitals  Vitals with min/max:      Vital Last Value 24 Hour Range   Temperature 98.2 °F (36.8 °C) (06/20/20 2000) Temp  Min: 96.8 °F (36 °C)  Max: 98.2 °F (36.8 °C)   Pulse (!) 101 (06/21/20 0700) Pulse   [] : No Min: 71  Max: 101   Respiratory (!) 23 (06/21/20 0700) Resp  Min: 15  Max: 37   Non-Invasive  Blood Pressure 108/59 (06/21/20 0700) BP  Min: 82/64  Max: 128/53   Pulse Oximetry 90 % (06/21/20 0700) SpO2  Min: 90 %  Max: 97 %   Arterial   Blood Pressure   No data recorded      Body mass index is 26.57 kg/m².    PHYSICAL EXAM   Physical Exam   Vitals signs and nursing note reviewed.    Due to current COVID pandemic, per hospital policy, did not personally evaluate patient to reduce exposure and conserve PPE.   Please see Attending's note for physical exam     LABS     Recent Results (from the past 24 hour(s))   Metered blood glucose    Collection Time: 06/20/20  5:01 PM   Result Value Ref Range    Glucose Bedside  (H) 70 - 99 mg/dL   Metered blood glucose    Collection Time: 06/20/20  6:22 PM   Result Value Ref Range    Glucose Bedside  (H) 70 - 99 mg/dL   Metered blood glucose    Collection Time: 06/20/20  9:03 PM   Result Value Ref Range    Glucose Bedside  (H) 70 - 99 mg/dL   CBC with Automated Differential    Collection Time: 06/21/20  4:15 AM   Result Value Ref Range    WBC 7.4 4.2 - 11.0 K/mcL    RBC 3.55 (L) 4.00 - 5.20 mil/mcL    HGB 11.0 (L) 12.0 - 15.5 g/dL    HCT 33.2 (L) 36.0 - 46.5 %    MCV 93.5 78.0 - 100.0 fl    MCH 31.0 26.0 - 34.0 pg    MCHC 33.1 32.0 - 36.5 g/dL    RDW-CV 15.2 (H) 11.0 - 15.0 %    PLT 67 (L) 140 - 450 K/mcL    NRBC 0 0 /100 WBC    DIFF TYPE AUTOMATED DIFFERENTIAL     Neutrophil 94 %    LYMPH 2 %    MONO 3 %    EOSIN 0 %    BASO 0 %    Percent Immature Granuloctyes 1 %    Absolute Neutrophil 6.9 1.8 - 7.7 K/mcL    Absolute Lymph 0.1 (L) 1.0 - 4.0 K/mcL    Absolute Mono 0.2 (L) 0.3 - 0.9 K/mcL    Absolute Eos 0.0 (L) 0.1 - 0.5 K/mcL    Absolute Baso 0.0 0.0 - 0.3 K/mcL    Absolute Immature Granulocytes 0.1 0 - 0.2 K/mcl   Magnesium    Collection Time: 06/21/20  4:15 AM   Result Value Ref Range    MAGNESIUM 2.5 (H) 1.7 - 2.4 mg/dL   Phosphorus    Collection Time:  [0] : 2) Feeling down, depressed, or hopeless: Not at all (0) 06/21/20  4:15 AM   Result Value Ref Range    PHOSPHORUS 2.9 2.4 - 4.7 mg/dL   Prothrombin Time    Collection Time: 06/21/20  4:15 AM   Result Value Ref Range    PROTIME 16.1 (H) 9.7 - 11.8 sec    INR 1.5    Partial Thromboplastin Time    Collection Time: 06/21/20  4:15 AM   Result Value Ref Range    PTT 30 22 - 32 sec   Comprehensive Metabolic Panel    Collection Time: 06/21/20  4:15 AM   Result Value Ref Range    Sodium 138 135 - 145 mmol/L    Potassium 4.3 3.4 - 5.1 mmol/L    Chloride 107 98 - 107 mmol/L    Carbon Dioxide 29 21 - 32 mmol/L    Anion Gap 6 (L) 10 - 20 mmol/L    Glucose 296 (H) 65 - 99 mg/dL    BUN 31 (H) 6 - 20 mg/dL    Creatinine 0.71 0.51 - 0.95 mg/dL    GFR Estimate,  >90     GFR Estimate, Non African American 87     BUN/Creatinine Ratio 43 (H) 7 - 25    CALCIUM 7.7 (L) 8.4 - 10.2 mg/dL    TOTAL BILIRUBIN 0.5 0.2 - 1.0 mg/dL    AST/SGOT 144 (H) <38 Units/L    ALT/SGPT 238 (H) <64 Units/L    ALK PHOSPHATASE 136 (H) 45 - 117 Units/L    TOTAL PROTEIN 6.0 (L) 6.4 - 8.2 g/dL    Albumin 1.9 (L) 3.6 - 5.1 g/dL    GLOBULIN 4.1 (H) 2.0 - 4.0 g/dL    A/G Ratio, Serum 0.5 (L) 1.0 - 2.4       IMAGING     US ABDOMEN LIMITED   Final Result   1.   Diffuse hepatic steatosis.   2.   Limited exam.      Electronically Signed by: RACHELLE PIERRE MD    Signed on: 6/20/2020 10:37 PM          XR CHEST PA OR AP 1 VIEW   Final Result   No significant change since 06/15/2020.      Electronically Signed by: RACHELLE PIERRE MD    Signed on: 6/18/2020 8:04 AM          XR CHEST PA OR AP 1 VIEW   Final Result       Increasing opacities in the left lung.         Electronically Signed by: SARIAH POPE MD    Signed on: 6/17/2020 8:26 AM          XR CHEST PA OR AP 1 VIEW   Final Result       Worsening of diffuse bilateral airspace and interstitial opacities compared to 06/12/2020.         Electronically Signed by: MIKE SOLANO M.D.    Signed on: 6/15/2020 7:35 PM          CT CHEST WO CONTRAST   Final Result     [[id number]] : 0   Extensive nonspecific bilateral interstitial alveolar pulmonary opacities.      Coronary artery calcifications.      Electronically Signed by: JACE BRITO M.D.    Signed on: 6/12/2020 11:23 PM          XR CHEST PA OR AP 1 VIEW   Final Result   1.   Worsened diffuse bilateral (left greater than right) interstitial opacities since 06/10/2020.  Differential considerations include edema, infection/inflammation, and/or hemorrhage.  Close clinical monitoring is recommended.   2.   Overall stable bibasilar patchy opacities likely representing pneumonia.      Electronically Signed by: RACHELLE PIERRE MD    Signed on: 6/12/2020 11:06 AM          XR CHEST PA OR AP 1 VIEW   Final Result   1.   New bilateral lung opacities, likely representing viral and/or bacterial infection.   2.   Partially limited exam.         Electronically Signed by: RACHELLE PIERRE MD    Signed on: 6/10/2020 4:40 PM          CT ABDOMEN PELVIS W CONTRAST - IV contrast only   Final Result   1.   New multiple bilateral lung opacities, likely infectious/inflammatory such as due to atypical viral infection.  Follow-up posttreatment chest radiograph is recommended.   2.   Possible mild nonspecific colitis.   3.   Slightly larger size of aortic aneurysm sac since 2018.  Outpatient follow-up/surveillance is suggested.   4.   Findings suggestive of portal hypertension.   5.   Please see report for additional and incidental findings.      Electronically Signed by: RACHELLE PIERRE MD    Signed on: 6/10/2020 4:27 PM              ASSESSMENT AND PLAN   Patient is a 68-year-old female with past medical history significant for rheumatoid arthritis, Sjogren's syndrome, pancreatic cancer (status post Whipple 10 years ago), hypertension, abdominal wall hernia (with abdominal wall binder), IBS who presented to [facility] initially on 6/10 for upper abdominal pain 4 days prior to admission associated with cough and dyspnea on exertion.  The etiology of her acute  hypoxic respiratory failure is unclear.  Initial plan was for patient to tentatively undergo bronchoscopy.  However, that will be delayed pending infectious work-up.      Found to be fungitell positive.    #Acute hypoxic respiratory failure  -Etiology unclear. Not likely cardiac. Possibly infectious vs auto-immune             -procal 0.14             -RVP negative             -TTE EF 70-75% with G1DD, NTproBNP 929             -covid neg x3   -crypto Ag, Anti Charisse, MARIBEL panel, Myeloperoxidase and protease 3 Ab, Histo Ag, HIV negative   -fungitell positive   -histo ab, blasto pending   -PJP sputum culture needs to be collected  -Oxygenation/Ventilation             -HFNC 60L/85%             -goal saturation > 90%             -If saturation < 90%, then intubate. No trial of NRM over HFNC  -Therapies             -solumedrol 60mg q8H day 5. Consider taper tomorrow   -s/p 8 days cefepime, s/p 3 days of azithro and ceftriaxone   -Bactrim 400mg TID daily day 2   -itraconazole day 3. D/c if blasto negative     #Metabolic Alkalosis  -likely contract alkalosis from diuresis  -hold on further diuresis     #Coagulopathy  -likely 2/2 to hepatic steatosis  -Recheck PT and PTT daily  -Mixing study: c/w mostly with factor deficiency  -s/p vitamin K 10 IV (06/18)     #Transaminitis  -LUQ US (06/20): diffuse hepatic steatosis  -avoid hepato-toxic medications  -CTM     #Acute on chronic Thrombocytopenia  -worsening  -likely 2/2 to sepsis  -HIT panel ordered     #History of pancreas cancer s/p whipple 10 years ago  -Creon 2 capsule TID     #DM  -in setting of pancreatectomy   -home lantus 15u nightly, and aspart TID  -switched to insulin gtt    #Anxiety  -Xanax 0.25mg q6H PRN     #Concern for dysphagia  -swallow eval 06/20: NPO except meds    Chronic Issues  -Mood disorder: amitriptyline 25mg nightly, fluoxetine 20mg daily  -HTN: hold home chlorthalidone while normotensive  -IBS: resume home dicylomine once more stable     DVT ppx: lovenox  ppx  GI ppx: protonix 40 IV qHS  Fluids: no IVF  Electrolytes: 16mmol phos  Nutrition: NPO except meds  Code status: Full code, decisional, daughters Dolores are surrogates      Case discussed with Dr. Kowalski  Please await for final attending recommendations  Mack Menon DO, PGY1  [employer]

## 2021-05-13 NOTE — HISTORY OF PRESENT ILLNESS
[FreeTextEntry8] : 64 y/o female with a history of laryngeal cancer status post stricture and kidney cancer status post partial nephrectomy who presents with chronic lower extremity edema.  She has had this for many years.  However now she notes that it is worse.  It used to be mostly her left leg but now her right leg is also involved.  She states when she wakes up in the morning she does not have swelling but within an hour she developed swelling.  She denies chest pain, palpitations or shortness of breath.  She saw her cardiologist a few years ago and had echocardiogram and a stress test which were fine.  She also saw the vascular surgeon and had an ablation for varicose vein which did not improve the symptoms.  She was planned for an iliac vein venogram but did not happen due to the pandemic.  She recently saw her urologist and had a renal ultrasound which was fine.  She had labs done in October which showed normal renal function and a slightly low albumin level.  She has a normal appetite.  She started drinking more water because she thought that could have been the cause.  When it did not improve she started drinking less water which also had no effect.  She has had muscle cramps.  She is not had increased salt in her diet.

## 2021-05-13 NOTE — PLAN
[FreeTextEntry1] : Depression screening negative \par BP is controlled\par She has had an extensive workup. She will reschedule iliac vein venogram\par WIll check TFTs, CMP and Magnesium today\par will call with results\par Discussed chronic renal insufficiency and compression stockings

## 2021-05-13 NOTE — PHYSICAL EXAM
[No Acute Distress] : no acute distress [Well Nourished] : well nourished [Well Developed] : well developed [Normal Sclera/Conjunctiva] : normal sclera/conjunctiva [Normal Outer Ear/Nose] : the outer ears and nose were normal in appearance [No Respiratory Distress] : no respiratory distress  [No Accessory Muscle Use] : no accessory muscle use [Clear to Auscultation] : lungs were clear to auscultation bilaterally [Normal Rate] : normal rate  [Regular Rhythm] : with a regular rhythm [Normal S1, S2] : normal S1 and S2 [Pedal Pulses Present] : the pedal pulses are present [No Extremity Clubbing/Cyanosis] : no extremity clubbing/cyanosis [Soft] : abdomen soft [Non Tender] : non-tender [Non-distended] : non-distended [Normal Bowel Sounds] : normal bowel sounds [Coordination Grossly Intact] : coordination grossly intact [Normal Gait] : normal gait [Normal Affect] : the affect was normal [Normal Insight/Judgement] : insight and judgment were intact [de-identified] : 1+ edema up to shins

## 2021-05-19 ENCOUNTER — TRANSCRIPTION ENCOUNTER (OUTPATIENT)
Age: 66
End: 2021-05-19

## 2021-05-19 ENCOUNTER — NON-APPOINTMENT (OUTPATIENT)
Age: 66
End: 2021-05-19

## 2021-05-19 DIAGNOSIS — E88.09 OTHER DISORDERS OF PLASMA-PROTEIN METABOLISM, NOT ELSEWHERE CLASSIFIED: ICD-10-CM

## 2021-05-19 DIAGNOSIS — R80.9 PROTEINURIA, UNSPECIFIED: ICD-10-CM

## 2021-05-28 ENCOUNTER — NON-APPOINTMENT (OUTPATIENT)
Age: 66
End: 2021-05-28

## 2021-06-25 ENCOUNTER — APPOINTMENT (OUTPATIENT)
Dept: NEPHROLOGY | Facility: CLINIC | Age: 66
End: 2021-06-25

## 2021-06-25 ENCOUNTER — NON-APPOINTMENT (OUTPATIENT)
Age: 66
End: 2021-06-25

## 2021-07-22 ENCOUNTER — NON-APPOINTMENT (OUTPATIENT)
Age: 66
End: 2021-07-22

## 2021-08-20 ENCOUNTER — APPOINTMENT (OUTPATIENT)
Dept: NEPHROLOGY | Facility: CLINIC | Age: 66
End: 2021-08-20

## 2021-09-07 ENCOUNTER — NON-APPOINTMENT (OUTPATIENT)
Age: 66
End: 2021-09-07

## 2022-04-11 PROBLEM — Z11.59 SCREENING FOR VIRAL DISEASE: Status: ACTIVE | Noted: 2020-10-23

## 2022-04-19 ENCOUNTER — TRANSCRIPTION ENCOUNTER (OUTPATIENT)
Age: 67
End: 2022-04-19

## 2022-04-25 ENCOUNTER — TRANSCRIPTION ENCOUNTER (OUTPATIENT)
Age: 67
End: 2022-04-25

## 2022-05-04 ENCOUNTER — TRANSCRIPTION ENCOUNTER (OUTPATIENT)
Age: 67
End: 2022-05-04

## 2022-05-11 ENCOUNTER — APPOINTMENT (OUTPATIENT)
Dept: INTERNAL MEDICINE | Facility: CLINIC | Age: 67
End: 2022-05-11
Payer: MEDICARE

## 2022-05-11 ENCOUNTER — NON-APPOINTMENT (OUTPATIENT)
Age: 67
End: 2022-05-11

## 2022-05-11 DIAGNOSIS — U07.1 COVID-19: ICD-10-CM

## 2022-05-11 PROCEDURE — 99214 OFFICE O/P EST MOD 30 MIN: CPT | Mod: CS,95

## 2022-05-12 ENCOUNTER — NON-APPOINTMENT (OUTPATIENT)
Age: 67
End: 2022-05-12

## 2022-05-12 PROBLEM — U07.1 COVID-19: Status: ACTIVE | Noted: 2022-05-11

## 2022-05-12 RX ORDER — IRBESARTAN 75 MG/1
75 TABLET ORAL DAILY
Refills: 0 | Status: ACTIVE | COMMUNITY

## 2022-05-12 RX ORDER — VALACYCLOVIR HYDROCHLORIDE 500 MG/1
500 TABLET, FILM COATED ORAL DAILY
Refills: 0 | Status: ACTIVE | COMMUNITY

## 2022-05-12 RX ORDER — FAMOTIDINE 20 MG/1
20 TABLET, FILM COATED ORAL
Refills: 0 | Status: ACTIVE | COMMUNITY

## 2022-05-12 RX ORDER — ASPIRIN 81 MG/1
81 TABLET ORAL
Refills: 0 | Status: ACTIVE | COMMUNITY

## 2022-05-12 RX ORDER — CHROMIUM 200 MCG
TABLET ORAL
Refills: 0 | Status: ACTIVE | COMMUNITY

## 2022-05-12 NOTE — PLAN
[FreeTextEntry1] : patient positive for COVID. She is on immunosuppressive medication.  Will treat her with Paxlovid. She has normal GFR. Medication interactions checked. Will hold crestor while she is on the paxlovid. She will let her oncologist know she took Paxlovid prior to getting her next dose of Daratumumab.\par Administration instructions given to patient. Risks/benefits discussed with patient

## 2022-05-12 NOTE — HISTORY OF PRESENT ILLNESS
[FreeTextEntry8] : 67 y/o female with lung cancer s/p chemo, amylodosis who tested positive for COVID today. She c/o feeling tired, which started today. No fever. His Pulse Ox is 97%\par Her  is hospitalized for COVID PNA.

## 2022-06-17 ENCOUNTER — APPOINTMENT (OUTPATIENT)
Dept: UROLOGY | Facility: CLINIC | Age: 67
End: 2022-06-17

## 2022-06-28 ENCOUNTER — APPOINTMENT (OUTPATIENT)
Dept: INTERNAL MEDICINE | Facility: CLINIC | Age: 67
End: 2022-06-28

## 2022-06-28 VITALS
SYSTOLIC BLOOD PRESSURE: 110 MMHG | TEMPERATURE: 98.2 F | BODY MASS INDEX: 28.56 KG/M2 | WEIGHT: 204 LBS | HEIGHT: 71 IN | DIASTOLIC BLOOD PRESSURE: 70 MMHG

## 2022-06-28 DIAGNOSIS — J01.90 ACUTE SINUSITIS, UNSPECIFIED: ICD-10-CM

## 2022-06-28 PROCEDURE — 99213 OFFICE O/P EST LOW 20 MIN: CPT

## 2022-06-28 RX ORDER — TOBRAMYCIN AND DEXAMETHASONE 3; 1 MG/ML; MG/ML
0.3-0.1 SUSPENSION/ DROPS OPHTHALMIC
Qty: 5 | Refills: 0 | Status: COMPLETED | COMMUNITY
Start: 2022-03-01

## 2022-06-28 RX ORDER — AMOXICILLIN 500 MG/1
500 TABLET, FILM COATED ORAL
Qty: 4 | Refills: 0 | Status: COMPLETED | COMMUNITY
Start: 2022-05-06

## 2022-06-28 RX ORDER — ALBUTEROL SULFATE 90 UG/1
108 (90 BASE) INHALANT RESPIRATORY (INHALATION)
Qty: 8 | Refills: 0 | Status: COMPLETED | COMMUNITY
Start: 2022-06-17

## 2022-06-28 RX ORDER — NIRMATRELVIR AND RITONAVIR 300-100 MG
20 X 150 MG & KIT ORAL
Qty: 1 | Refills: 0 | Status: DISCONTINUED | COMMUNITY
Start: 2022-05-11 | End: 2022-06-28

## 2022-06-28 RX ORDER — GABAPENTIN 300 MG/1
300 CAPSULE ORAL
Qty: 30 | Refills: 0 | Status: COMPLETED | COMMUNITY
Start: 2022-04-27

## 2022-06-28 RX ORDER — AZITHROMYCIN 250 MG/1
250 TABLET, FILM COATED ORAL
Qty: 6 | Refills: 0 | Status: COMPLETED | COMMUNITY
Start: 2022-06-22

## 2022-06-28 RX ORDER — FILGRASTIM-AAFI 480 UG/.8ML
480 INJECTION, SOLUTION SUBCUTANEOUS
Qty: 13 | Refills: 0 | Status: COMPLETED | COMMUNITY
Start: 2022-03-14

## 2022-06-28 RX ORDER — IRBESARTAN 150 MG/1
150 TABLET ORAL
Qty: 30 | Refills: 0 | Status: COMPLETED | COMMUNITY
Start: 2022-02-11

## 2022-06-28 RX ORDER — COVID-19 MOLECULAR TEST ASSAY
KIT MISCELLANEOUS
Qty: 8 | Refills: 0 | Status: COMPLETED | COMMUNITY
Start: 2022-05-12

## 2022-06-28 NOTE — PHYSICAL EXAM
[No Acute Distress] : no acute distress [Well Nourished] : well nourished [Normal Sclera/Conjunctiva] : normal sclera/conjunctiva [Normal Outer Ear/Nose] : the outer ears and nose were normal in appearance [Normal Oropharynx] : the oropharynx was normal [Normal TMs] : both tympanic membranes were normal [No Respiratory Distress] : no respiratory distress  [No Accessory Muscle Use] : no accessory muscle use [Clear to Auscultation] : lungs were clear to auscultation bilaterally [Normal Rate] : normal rate  [Regular Rhythm] : with a regular rhythm [Normal S1, S2] : normal S1 and S2 [Normal Affect] : the affect was normal [Normal Insight/Judgement] : insight and judgment were intact

## 2022-06-28 NOTE — HEALTH RISK ASSESSMENT
Bell's Palsy: Care Instructions  Your Care Instructions     Bell's palsy is paralysis or weakness of the muscles on one side of the face. Often people with Bell's palsy have a droop on one side of the mouth and have trouble completely shutting the eye on the same side. Bell's palsy can also interfere with the sense of taste. These things happen when a nerve in your face becomes inflamed. Bell's palsy is not caused by a stroke. The cause of the nerve inflammation is not known. But some experts think that a virus may cause it. Because of this, doctors sometimes prescribe antiviral medicine to treat it. You also may get medicine to reduce swelling. Bell's palsy usually gets better on its own in a few weeks or months. Follow-up care is a key part of your treatment and safety. Be sure to make and go to all appointments, and call your doctor if you are having problems. It's also a good idea to know your test results and keep a list of the medicines you take. How can you care for yourself at home? · Take your medicines exactly as prescribed. Call your doctor if you think you are having a problem with your medicine. You will get more details on the specific medicines your doctor prescribes. · Use artificial tears or ointment if your eyes are too dry. Bell's palsy can make your lower eyelid droop, causing a dry eye. · If you cannot completely close your eye, consider using an eye patch while you sleep. · Help yourself blink by using your finger to close and open your eyelid. This may help keep your eye moist.  · Wear glasses or goggles to keep dust and dirt out of your eye. · As feeling comes back to your face, massage your forehead, cheeks, and lips. Massage may make the muscles in your face stronger. · Brush and floss your teeth often to help prevent tooth decay. Bell's palsy can dry up the spit on one side of your mouth. This increases the risk of tooth decay. When should you call for help?    Call [phone] [Former] : Former [0] : 2) Feeling down, depressed, or hopeless: Not at all (0) PHQ-2 Negative - No further assessment needed] : PHQ-2 Negative - No further assessment needed [[id number]] : 0

## 2022-06-28 NOTE — PLAN
[FreeTextEntry1] : will treat with augmentin for sinusitis\par Rx for mammo/breast US\par f/u if no improvement

## 2022-06-28 NOTE — HISTORY OF PRESENT ILLNESS
[FreeTextEntry8] : 65 y/o Female with h/o amylodosis, lung cancer who presents with 2 weeks of nasal discharge, cough with sputum, headache, tooth pain. She was given a zpack by her nephrologist. She completed the course but the symptoms did not resolve

## 2022-06-28 NOTE — REVIEW OF SYSTEMS
[Headache] : headache [Dizziness] : no dizziness [Negative] : Cardiovascular [FreeTextEntry4] : as noted in HPI  [FreeTextEntry6] : as noted in HPI

## 2022-09-16 NOTE — H&P PST ADULT - SKIN COMMENTS
Department of Anesthesiology  Postprocedure Note    Patient: Adis Mcmanus  MRN: [id number]  YOB: 1958  Date of evaluation: 9/16/2022      Procedure Summary     Date: 09/16/22 Room / Location: [address] [place] [address] 04 / [address]    Anesthesia Start: 7665 Anesthesia Stop: 3132    Procedure: PARS PLANA VITRECTOMY, INTERNAL LIMITING MEMBRANE PEEL,INDOCYANINE GREEN,GAS FLUIDEXCHANGE, 25G MAC, LEFT EYE (Left: Eye) Diagnosis:       Lamellar macular hole of left eye      (Lamellar macular hole of left eye [H35.342])    Surgeons: Mritha Montenegro MD Responsible Provider: Loreto Cedillo MD    Anesthesia Type: MAC ASA Status: 2          Anesthesia Type: No value filed.     Howie Phase I: Howie Score: 10    Howie Phase II: Howie Score: 10      Anesthesia Post Evaluation    Patient location during evaluation: PACU  Patient participation: complete - patient participated  Level of consciousness: awake  Airway patency: patent  Nausea & Vomiting: nausea and vomiting  Complications: no  Cardiovascular status: hemodynamically stable  Respiratory status: room air and spontaneous ventilation  Hydration status: stable  Comments: PONV---treated with Compazine
Left lower extremity varicose veins

## 2023-05-30 ENCOUNTER — APPOINTMENT (OUTPATIENT)
Dept: INTERNAL MEDICINE | Facility: CLINIC | Age: 68
End: 2023-05-30
Payer: MEDICARE

## 2023-05-30 VITALS
BODY MASS INDEX: 29.82 KG/M2 | HEART RATE: 88 BPM | RESPIRATION RATE: 17 BRPM | DIASTOLIC BLOOD PRESSURE: 80 MMHG | OXYGEN SATURATION: 98 % | SYSTOLIC BLOOD PRESSURE: 158 MMHG | HEIGHT: 71 IN | TEMPERATURE: 97.8 F | WEIGHT: 213 LBS

## 2023-05-30 DIAGNOSIS — Z00.00 ENCOUNTER FOR GENERAL ADULT MEDICAL EXAMINATION W/OUT ABNORMAL FINDINGS: ICD-10-CM

## 2023-05-30 DIAGNOSIS — E85.9 AMYLOIDOSIS, UNSPECIFIED: Chronic | ICD-10-CM

## 2023-05-30 DIAGNOSIS — B37.2 CANDIDIASIS OF SKIN AND NAIL: ICD-10-CM

## 2023-05-30 DIAGNOSIS — E78.5 HYPERLIPIDEMIA, UNSPECIFIED: ICD-10-CM

## 2023-05-30 PROCEDURE — G0439: CPT

## 2023-05-30 PROCEDURE — 36415 COLL VENOUS BLD VENIPUNCTURE: CPT

## 2023-05-30 RX ORDER — AMOXICILLIN AND CLAVULANATE POTASSIUM 875; 125 MG/1; MG/1
875-125 TABLET, COATED ORAL
Qty: 20 | Refills: 0 | Status: DISCONTINUED | COMMUNITY
Start: 2022-06-28 | End: 2023-05-30

## 2023-05-30 RX ORDER — METOPROLOL SUCCINATE 25 MG/1
25 TABLET, EXTENDED RELEASE ORAL
Qty: 30 | Refills: 0 | Status: ACTIVE | COMMUNITY
Start: 2023-05-22

## 2023-05-30 NOTE — HEALTH RISK ASSESSMENT
[0] : 2) Feeling down, depressed, or hopeless: Not at all (0) [Patient reported PAP Smear was normal] : Patient reported PAP Smear was normal [Patient declined colonoscopy] : Patient declined colonoscopy [Hepatitis C test offered] : Hepatitis C test offered [With Family] : lives with family [Retired] : retired [] :  [# Of Children ___] : has [unfilled] children [Fully functional (bathing, dressing, toileting, transferring, walking, feeding)] : Fully functional (bathing, dressing, toileting, transferring, walking, feeding) [Fully functional (using the telephone, shopping, preparing meals, housekeeping, doing laundry, using] : Fully functional and needs no help or supervision to perform IADLs (using the telephone, shopping, preparing meals, housekeeping, doing laundry, using transportation, managing medications and managing finances) [PHQ-2 Negative - No further assessment needed] : PHQ-2 Negative - No further assessment needed [Former] : Former [de-identified] : rare [[id number]] : 0 [Reports changes in hearing] : Reports no changes in hearing [Reports changes in vision] : Reports no changes in vision [Reports changes in dental health] : Reports no changes in dental health [PapSmearDate] : 01/21 [de-identified] :  for weight watchers [de-identified] : 1985

## 2023-05-30 NOTE — HISTORY OF PRESENT ILLNESS
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  Prior to the diagnosis of amyloidosis and lung cancer she had lost more than 50 pounds.  Since beginning treatments she craves carbohydrates and no longer enjoys meat.  She is not getting any exercise.  She has gained weight.\par She saw cardiology recently\par Seeing nephrology.  Her blood pressure has been elevated.  Her creatinine has also been elevated.  She is alternating 40 mg of torsemide with 20 mg of torsemide daily. Metoprolol was added instead of increasing irbesartan\par Over the winter she got a lot of colds from her grandchildren.  She is now getting IVIG.\par She is seen at [facility]. every 6 month CTs\par She cannot lose weight. Watches grandchildren. Not able to get exercise. \par \par pap 2018\par mammo 2021\par colonoscopy never \par skin exam 2022\par

## 2023-05-30 NOTE — PLAN
[FreeTextEntry1] : Hypertension\par Blood pressure is elevated today but she did not take her medications\par Continue irbesartan and metoprolol\par Follow-up with cardiology and nephrology\par Work on improving diet and getting regular exercise.  Weight loss.  She is going to check to see if any of the local weight watchers offices are open again.\par \par Hyperlipidemia\par Check lipids and CMP today\par Continue Crestor\par Maintain healthy diet and exercise\par \par Amyloidosis/chronic renal insufficiency/proteinuria\par Check renal function and urinalysis\par Follow-up with nephrology\par Torsemide per nephrology\par Continue irbesartan\par \par Non-small cell lung cancer\par Follow-up with oncology at [facility]\par \par Intertrigo\par Nystatin cream\par Keep area dry\par \par Health maintenance\par Depression screening negative \par Check labs today, including CBC, CMP, TSH, HbA1c, lipids. Blood collected in office. \par She has a prescription for a mammogram\par Given a prescription for a bone density\par Pneumovax next visit unless she already had it done at [facility]\par \par Follow-up as needed

## 2023-05-30 NOTE — PHYSICAL EXAM
[Well Developed] : well developed [Well-Appearing] : well-appearing [Normal Sclera/Conjunctiva] : normal sclera/conjunctiva [PERRL] : pupils equal round and reactive to light [EOMI] : extraocular movements intact [Normal Outer Ear/Nose] : the outer ears and nose were normal in appearance [No JVD] : no jugular venous distention [No Lymphadenopathy] : no lymphadenopathy [Supple] : supple [Thyroid Normal, No Nodules] : the thyroid was normal and there were no nodules present [No Respiratory Distress] : no respiratory distress  [No Accessory Muscle Use] : no accessory muscle use [Clear to Auscultation] : lungs were clear to auscultation bilaterally [Normal Rate] : normal rate  [Regular Rhythm] : with a regular rhythm [Normal S1, S2] : normal S1 and S2 [No Murmur] : no murmur heard [No Carotid Bruits] : no carotid bruits [No Varicosities] : no varicosities [Pedal Pulses Present] : the pedal pulses are present [No Extremity Clubbing/Cyanosis] : no extremity clubbing/cyanosis [Normal Appearance] : normal in appearance [No Nipple Discharge] : no nipple discharge [No Axillary Lymphadenopathy] : no axillary lymphadenopathy [Soft] : abdomen soft [Non Tender] : non-tender [Non-distended] : non-distended [No Masses] : no abdominal mass palpated [No HSM] : no HSM [Normal Bowel Sounds] : normal bowel sounds [No CVA Tenderness] : no CVA  tenderness [No Spinal Tenderness] : no spinal tenderness [No Joint Swelling] : no joint swelling [Grossly Normal Strength/Tone] : grossly normal strength/tone [No Rash] : no rash [Coordination Grossly Intact] : coordination grossly intact [No Focal Deficits] : no focal deficits [Normal Gait] : normal gait [Normal Affect] : the affect was normal [Alert and Oriented x3] : oriented to person, place, and time [Normal Insight/Judgement] : insight and judgment were intact [No Acute Distress] : no acute distress [Normal Oropharynx] : the oropharynx was normal [Normal TMs] : both tympanic membranes were normal [Normal Posterior Cervical Nodes] : no posterior cervical lymphadenopathy [Normal Anterior Cervical Nodes] : no anterior cervical lymphadenopathy [de-identified] : overweight [de-identified] : ankle edema [de-identified] : Rash under right breast

## 2023-06-08 ENCOUNTER — TRANSCRIPTION ENCOUNTER (OUTPATIENT)
Age: 68
End: 2023-06-08

## 2023-06-08 LAB
ALBUMIN SERPL ELPH-MCNC: 3.9 G/DL
ALP BLD-CCNC: 74 U/L
ALT SERPL-CCNC: 13 U/L
ANION GAP SERPL CALC-SCNC: 10 MMOL/L
APPEARANCE: ABNORMAL
AST SERPL-CCNC: 20 U/L
BACTERIA: NEGATIVE /HPF
BILIRUB SERPL-MCNC: 0.2 MG/DL
BILIRUBIN URINE: NEGATIVE
BLOOD URINE: ABNORMAL
BUN SERPL-MCNC: 54 MG/DL
CALCIUM SERPL-MCNC: 9.5 MG/DL
CAST: 0 /LPF
CHLORIDE SERPL-SCNC: 105 MMOL/L
CHOLEST SERPL-MCNC: 202 MG/DL
CO2 SERPL-SCNC: 24 MMOL/L
COLOR: YELLOW
CREAT SERPL-MCNC: 1.23 MG/DL
EGFR: 48 ML/MIN/1.73M2
EPITHELIAL CELLS: 3 /HPF
ESTIMATED AVERAGE GLUCOSE: 105 MG/DL
GLUCOSE QUALITATIVE U: NEGATIVE MG/DL
GLUCOSE SERPL-MCNC: 95 MG/DL
HBA1C MFR BLD HPLC: 5.3 %
HDLC SERPL-MCNC: 82 MG/DL
KETONES URINE: NEGATIVE MG/DL
LDLC SERPL CALC-MCNC: 95 MG/DL
LEUKOCYTE ESTERASE URINE: NEGATIVE
MICROSCOPIC-UA: NORMAL
NITRITE URINE: NEGATIVE
NONHDLC SERPL-MCNC: 119 MG/DL
PH URINE: 6
POTASSIUM SERPL-SCNC: 4.5 MMOL/L
PROT SERPL-MCNC: 6.1 G/DL
PROTEIN URINE: >=1000 MG/DL
RED BLOOD CELLS URINE: 20 /HPF
SODIUM SERPL-SCNC: 140 MMOL/L
SPECIFIC GRAVITY URINE: 1.02
TRIGL SERPL-MCNC: 121 MG/DL
TSH SERPL-ACNC: 3.94 UIU/ML
UROBILINOGEN URINE: 0.2 MG/DL
WHITE BLOOD CELLS URINE: 2 /HPF

## 2023-09-11 ENCOUNTER — APPOINTMENT (OUTPATIENT)
Dept: UROLOGY | Facility: CLINIC | Age: 68
End: 2023-09-11
Payer: MEDICARE

## 2023-09-11 VITALS
DIASTOLIC BLOOD PRESSURE: 82 MMHG | BODY MASS INDEX: 29.82 KG/M2 | TEMPERATURE: 97.8 F | SYSTOLIC BLOOD PRESSURE: 156 MMHG | HEIGHT: 71 IN | RESPIRATION RATE: 17 BRPM | HEART RATE: 73 BPM | WEIGHT: 213 LBS

## 2023-09-11 PROCEDURE — 99213 OFFICE O/P EST LOW 20 MIN: CPT

## 2023-09-14 LAB — URINE CYTOLOGY: NORMAL

## 2023-09-15 RX ORDER — TORSEMIDE 20 MG/1
20 TABLET ORAL DAILY
Refills: 0 | Status: ACTIVE | COMMUNITY

## 2023-09-15 RX ORDER — ROSUVASTATIN CALCIUM 5 MG/1
5 TABLET, FILM COATED ORAL DAILY
Refills: 0 | Status: DISCONTINUED | COMMUNITY
End: 2023-09-15

## 2023-10-13 ENCOUNTER — APPOINTMENT (OUTPATIENT)
Dept: UROLOGY | Facility: CLINIC | Age: 68
End: 2023-10-13
Payer: MEDICARE

## 2023-10-13 ENCOUNTER — OUTPATIENT (OUTPATIENT)
Dept: OUTPATIENT SERVICES | Facility: HOSPITAL | Age: 68
LOS: 1 days | End: 2023-10-13
Payer: MEDICARE

## 2023-10-13 VITALS — SYSTOLIC BLOOD PRESSURE: 158 MMHG | HEART RATE: 90 BPM | DIASTOLIC BLOOD PRESSURE: 78 MMHG | RESPIRATION RATE: 16 BRPM

## 2023-10-13 DIAGNOSIS — C65.9 MALIGNANT NEOPLASM OF UNSPECIFIED RENAL PELVIS: ICD-10-CM

## 2023-10-13 DIAGNOSIS — R35.0 FREQUENCY OF MICTURITION: ICD-10-CM

## 2023-10-13 DIAGNOSIS — C64.9 MALIGNANT NEOPLASM OF UNSPECIFIED KIDNEY, EXCEPT RENAL PELVIS: ICD-10-CM

## 2023-10-13 DIAGNOSIS — R31.29 OTHER MICROSCOPIC HEMATURIA: ICD-10-CM

## 2023-10-13 DIAGNOSIS — Z90.02 ACQUIRED ABSENCE OF LARYNX: Chronic | ICD-10-CM

## 2023-10-13 DIAGNOSIS — Z98.890 OTHER SPECIFIED POSTPROCEDURAL STATES: Chronic | ICD-10-CM

## 2023-10-13 PROCEDURE — 52000 CYSTOURETHROSCOPY: CPT

## 2023-10-16 DIAGNOSIS — R91.1 SOLITARY PULMONARY NODULE: ICD-10-CM

## 2023-10-16 DIAGNOSIS — C65.9 MALIGNANT NEOPLASM OF UNSPECIFIED RENAL PELVIS: ICD-10-CM

## 2023-10-16 DIAGNOSIS — C64.9 MALIGNANT NEOPLASM OF UNSPECIFIED KIDNEY, EXCEPT RENAL PELVIS: ICD-10-CM

## 2023-10-16 DIAGNOSIS — R31.29 OTHER MICROSCOPIC HEMATURIA: ICD-10-CM

## 2023-11-20 ENCOUNTER — EMERGENCY (EMERGENCY)
Facility: HOSPITAL | Age: 68
LOS: 1 days | Discharge: HOME | End: 2023-11-20
Attending: EMERGENCY MEDICINE | Admitting: EMERGENCY MEDICINE
Payer: MEDICARE

## 2023-11-20 VITALS
HEART RATE: 85 BPM | OXYGEN SATURATION: 97 % | DIASTOLIC BLOOD PRESSURE: 84 MMHG | SYSTOLIC BLOOD PRESSURE: 152 MMHG | TEMPERATURE: 98 F | RESPIRATION RATE: 17 BRPM

## 2023-11-20 VITALS
HEART RATE: 96 BPM | SYSTOLIC BLOOD PRESSURE: 163 MMHG | HEIGHT: 71 IN | RESPIRATION RATE: 18 BRPM | TEMPERATURE: 98 F | OXYGEN SATURATION: 99 % | DIASTOLIC BLOOD PRESSURE: 86 MMHG | WEIGHT: 214.95 LBS

## 2023-11-20 DIAGNOSIS — Z98.890 OTHER SPECIFIED POSTPROCEDURAL STATES: Chronic | ICD-10-CM

## 2023-11-20 DIAGNOSIS — Z90.02 ACQUIRED ABSENCE OF LARYNX: Chronic | ICD-10-CM

## 2023-11-20 LAB
ALBUMIN SERPL ELPH-MCNC: 2.9 G/DL — LOW (ref 3.3–5)
ALBUMIN SERPL ELPH-MCNC: 2.9 G/DL — LOW (ref 3.3–5)
ALP SERPL-CCNC: 83 U/L — SIGNIFICANT CHANGE UP (ref 40–120)
ALP SERPL-CCNC: 83 U/L — SIGNIFICANT CHANGE UP (ref 40–120)
ALT FLD-CCNC: 15 U/L — SIGNIFICANT CHANGE UP (ref 12–78)
ALT FLD-CCNC: 15 U/L — SIGNIFICANT CHANGE UP (ref 12–78)
ANION GAP SERPL CALC-SCNC: 6 MMOL/L — SIGNIFICANT CHANGE UP (ref 5–17)
ANION GAP SERPL CALC-SCNC: 6 MMOL/L — SIGNIFICANT CHANGE UP (ref 5–17)
APTT BLD: 34 SEC — SIGNIFICANT CHANGE UP (ref 24.5–35.6)
APTT BLD: 34 SEC — SIGNIFICANT CHANGE UP (ref 24.5–35.6)
AST SERPL-CCNC: 11 U/L — LOW (ref 15–37)
AST SERPL-CCNC: 11 U/L — LOW (ref 15–37)
BASOPHILS # BLD AUTO: 0.06 K/UL — SIGNIFICANT CHANGE UP (ref 0–0.2)
BASOPHILS # BLD AUTO: 0.06 K/UL — SIGNIFICANT CHANGE UP (ref 0–0.2)
BASOPHILS NFR BLD AUTO: 0.6 % — SIGNIFICANT CHANGE UP (ref 0–2)
BASOPHILS NFR BLD AUTO: 0.6 % — SIGNIFICANT CHANGE UP (ref 0–2)
BILIRUB SERPL-MCNC: 0.2 MG/DL — SIGNIFICANT CHANGE UP (ref 0.2–1.2)
BILIRUB SERPL-MCNC: 0.2 MG/DL — SIGNIFICANT CHANGE UP (ref 0.2–1.2)
BUN SERPL-MCNC: 52 MG/DL — HIGH (ref 7–23)
BUN SERPL-MCNC: 52 MG/DL — HIGH (ref 7–23)
CALCIUM SERPL-MCNC: 8.7 MG/DL — SIGNIFICANT CHANGE UP (ref 8.5–10.1)
CALCIUM SERPL-MCNC: 8.7 MG/DL — SIGNIFICANT CHANGE UP (ref 8.5–10.1)
CHLORIDE SERPL-SCNC: 109 MMOL/L — HIGH (ref 96–108)
CHLORIDE SERPL-SCNC: 109 MMOL/L — HIGH (ref 96–108)
CO2 SERPL-SCNC: 27 MMOL/L — SIGNIFICANT CHANGE UP (ref 22–31)
CO2 SERPL-SCNC: 27 MMOL/L — SIGNIFICANT CHANGE UP (ref 22–31)
CREAT SERPL-MCNC: 1.8 MG/DL — HIGH (ref 0.5–1.3)
CREAT SERPL-MCNC: 1.8 MG/DL — HIGH (ref 0.5–1.3)
EGFR: 30 ML/MIN/1.73M2 — LOW
EGFR: 30 ML/MIN/1.73M2 — LOW
EOSINOPHIL # BLD AUTO: 0.07 K/UL — SIGNIFICANT CHANGE UP (ref 0–0.5)
EOSINOPHIL # BLD AUTO: 0.07 K/UL — SIGNIFICANT CHANGE UP (ref 0–0.5)
EOSINOPHIL NFR BLD AUTO: 0.8 % — SIGNIFICANT CHANGE UP (ref 0–6)
EOSINOPHIL NFR BLD AUTO: 0.8 % — SIGNIFICANT CHANGE UP (ref 0–6)
GLUCOSE SERPL-MCNC: 128 MG/DL — HIGH (ref 70–99)
GLUCOSE SERPL-MCNC: 128 MG/DL — HIGH (ref 70–99)
HCT VFR BLD CALC: 29.1 % — LOW (ref 34.5–45)
HCT VFR BLD CALC: 29.1 % — LOW (ref 34.5–45)
HGB BLD-MCNC: 9.8 G/DL — LOW (ref 11.5–15.5)
HGB BLD-MCNC: 9.8 G/DL — LOW (ref 11.5–15.5)
IMM GRANULOCYTES NFR BLD AUTO: 0.3 % — SIGNIFICANT CHANGE UP (ref 0–0.9)
IMM GRANULOCYTES NFR BLD AUTO: 0.3 % — SIGNIFICANT CHANGE UP (ref 0–0.9)
INR BLD: 0.99 RATIO — SIGNIFICANT CHANGE UP (ref 0.85–1.18)
INR BLD: 0.99 RATIO — SIGNIFICANT CHANGE UP (ref 0.85–1.18)
LYMPHOCYTES # BLD AUTO: 0.61 K/UL — LOW (ref 1–3.3)
LYMPHOCYTES # BLD AUTO: 0.61 K/UL — LOW (ref 1–3.3)
LYMPHOCYTES # BLD AUTO: 6.6 % — LOW (ref 13–44)
LYMPHOCYTES # BLD AUTO: 6.6 % — LOW (ref 13–44)
MCHC RBC-ENTMCNC: 31.4 PG — SIGNIFICANT CHANGE UP (ref 27–34)
MCHC RBC-ENTMCNC: 31.4 PG — SIGNIFICANT CHANGE UP (ref 27–34)
MCHC RBC-ENTMCNC: 33.7 GM/DL — SIGNIFICANT CHANGE UP (ref 32–36)
MCHC RBC-ENTMCNC: 33.7 GM/DL — SIGNIFICANT CHANGE UP (ref 32–36)
MCV RBC AUTO: 93.3 FL — SIGNIFICANT CHANGE UP (ref 80–100)
MCV RBC AUTO: 93.3 FL — SIGNIFICANT CHANGE UP (ref 80–100)
MONOCYTES # BLD AUTO: 0.97 K/UL — HIGH (ref 0–0.9)
MONOCYTES # BLD AUTO: 0.97 K/UL — HIGH (ref 0–0.9)
MONOCYTES NFR BLD AUTO: 10.5 % — SIGNIFICANT CHANGE UP (ref 2–14)
MONOCYTES NFR BLD AUTO: 10.5 % — SIGNIFICANT CHANGE UP (ref 2–14)
NEUTROPHILS # BLD AUTO: 7.53 K/UL — HIGH (ref 1.8–7.4)
NEUTROPHILS # BLD AUTO: 7.53 K/UL — HIGH (ref 1.8–7.4)
NEUTROPHILS NFR BLD AUTO: 81.2 % — HIGH (ref 43–77)
NEUTROPHILS NFR BLD AUTO: 81.2 % — HIGH (ref 43–77)
NRBC # BLD: 0 /100 WBCS — SIGNIFICANT CHANGE UP (ref 0–0)
NRBC # BLD: 0 /100 WBCS — SIGNIFICANT CHANGE UP (ref 0–0)
PLATELET # BLD AUTO: 301 K/UL — SIGNIFICANT CHANGE UP (ref 150–400)
PLATELET # BLD AUTO: 301 K/UL — SIGNIFICANT CHANGE UP (ref 150–400)
POTASSIUM SERPL-MCNC: 4.2 MMOL/L — SIGNIFICANT CHANGE UP (ref 3.5–5.3)
POTASSIUM SERPL-MCNC: 4.2 MMOL/L — SIGNIFICANT CHANGE UP (ref 3.5–5.3)
POTASSIUM SERPL-SCNC: 4.2 MMOL/L — SIGNIFICANT CHANGE UP (ref 3.5–5.3)
POTASSIUM SERPL-SCNC: 4.2 MMOL/L — SIGNIFICANT CHANGE UP (ref 3.5–5.3)
PROT SERPL-MCNC: 7 G/DL — SIGNIFICANT CHANGE UP (ref 6–8.3)
PROT SERPL-MCNC: 7 G/DL — SIGNIFICANT CHANGE UP (ref 6–8.3)
PROTHROM AB SERPL-ACNC: 11.6 SEC — SIGNIFICANT CHANGE UP (ref 9.5–13)
PROTHROM AB SERPL-ACNC: 11.6 SEC — SIGNIFICANT CHANGE UP (ref 9.5–13)
RBC # BLD: 3.12 M/UL — LOW (ref 3.8–5.2)
RBC # BLD: 3.12 M/UL — LOW (ref 3.8–5.2)
RBC # FLD: 13.8 % — SIGNIFICANT CHANGE UP (ref 10.3–14.5)
RBC # FLD: 13.8 % — SIGNIFICANT CHANGE UP (ref 10.3–14.5)
SODIUM SERPL-SCNC: 142 MMOL/L — SIGNIFICANT CHANGE UP (ref 135–145)
SODIUM SERPL-SCNC: 142 MMOL/L — SIGNIFICANT CHANGE UP (ref 135–145)
TROPONIN I, HIGH SENSITIVITY RESULT: 26.7 NG/L — SIGNIFICANT CHANGE UP
TROPONIN I, HIGH SENSITIVITY RESULT: 26.7 NG/L — SIGNIFICANT CHANGE UP
WBC # BLD: 9.27 K/UL — SIGNIFICANT CHANGE UP (ref 3.8–10.5)
WBC # BLD: 9.27 K/UL — SIGNIFICANT CHANGE UP (ref 3.8–10.5)
WBC # FLD AUTO: 9.27 K/UL — SIGNIFICANT CHANGE UP (ref 3.8–10.5)
WBC # FLD AUTO: 9.27 K/UL — SIGNIFICANT CHANGE UP (ref 3.8–10.5)

## 2023-11-20 PROCEDURE — 71250 CT THORAX DX C-: CPT | Mod: MA

## 2023-11-20 PROCEDURE — 71045 X-RAY EXAM CHEST 1 VIEW: CPT | Mod: 26

## 2023-11-20 PROCEDURE — 96374 THER/PROPH/DIAG INJ IV PUSH: CPT

## 2023-11-20 PROCEDURE — 36415 COLL VENOUS BLD VENIPUNCTURE: CPT

## 2023-11-20 PROCEDURE — 96375 TX/PRO/DX INJ NEW DRUG ADDON: CPT

## 2023-11-20 PROCEDURE — 93005 ELECTROCARDIOGRAM TRACING: CPT

## 2023-11-20 PROCEDURE — 99285 EMERGENCY DEPT VISIT HI MDM: CPT | Mod: 25

## 2023-11-20 PROCEDURE — 71045 X-RAY EXAM CHEST 1 VIEW: CPT

## 2023-11-20 PROCEDURE — 80053 COMPREHEN METABOLIC PANEL: CPT

## 2023-11-20 PROCEDURE — 71250 CT THORAX DX C-: CPT | Mod: 26,MA

## 2023-11-20 PROCEDURE — 99285 EMERGENCY DEPT VISIT HI MDM: CPT | Mod: FS

## 2023-11-20 PROCEDURE — 85730 THROMBOPLASTIN TIME PARTIAL: CPT

## 2023-11-20 PROCEDURE — 84484 ASSAY OF TROPONIN QUANT: CPT

## 2023-11-20 PROCEDURE — 93010 ELECTROCARDIOGRAM REPORT: CPT

## 2023-11-20 PROCEDURE — 85025 COMPLETE CBC W/AUTO DIFF WBC: CPT

## 2023-11-20 PROCEDURE — 85610 PROTHROMBIN TIME: CPT

## 2023-11-20 RX ORDER — ONDANSETRON 8 MG/1
4 TABLET, FILM COATED ORAL ONCE
Refills: 0 | Status: COMPLETED | OUTPATIENT
Start: 2023-11-20 | End: 2023-11-20

## 2023-11-20 RX ORDER — LIDOCAINE 4 G/100G
1 CREAM TOPICAL ONCE
Refills: 0 | Status: COMPLETED | OUTPATIENT
Start: 2023-11-20 | End: 2023-11-20

## 2023-11-20 RX ORDER — MORPHINE SULFATE 50 MG/1
4 CAPSULE, EXTENDED RELEASE ORAL ONCE
Refills: 0 | Status: DISCONTINUED | OUTPATIENT
Start: 2023-11-20 | End: 2023-11-20

## 2023-11-20 RX ORDER — OXYCODONE AND ACETAMINOPHEN 5; 325 MG/1; MG/1
1 TABLET ORAL ONCE
Refills: 0 | Status: DISCONTINUED | OUTPATIENT
Start: 2023-11-20 | End: 2023-11-20

## 2023-11-20 RX ORDER — OXYCODONE AND ACETAMINOPHEN 5; 325 MG/1; MG/1
1 TABLET ORAL
Qty: 9 | Refills: 0
Start: 2023-11-20 | End: 2023-11-22

## 2023-11-20 RX ADMIN — MORPHINE SULFATE 4 MILLIGRAM(S): 50 CAPSULE, EXTENDED RELEASE ORAL at 17:49

## 2023-11-20 RX ADMIN — LIDOCAINE 1 PATCH: 4 CREAM TOPICAL at 17:46

## 2023-11-20 RX ADMIN — ONDANSETRON 4 MILLIGRAM(S): 8 TABLET, FILM COATED ORAL at 17:49

## 2023-11-20 NOTE — ED PROVIDER NOTE - CROS ED GI ALL NEG
[FreeTextEntry1] : Presents for clarification of sports clearance due to previous notation of positive pregnancy test. After discussion with patient privately and group conversation (after permission from patient), family affirms pregnancy was terminated. Followed by OBGYN, and has upcoming follow up in the next month. Sports clearance forms completed as full exam was performed at previous well visit (08/2022). Return to office if new concerns arise. 
negative...

## 2023-11-20 NOTE — ED PROVIDER NOTE - PHYSICAL EXAMINATION
Constitutional: Awake, Alert, non-toxic. NAD. Well appearing, well nourished.   HEAD: Normocephalic, atraumatic.   EYES: EOM intact, conjunctiva and sclera are clear bilaterally.   ENT: No rhinorrhea, patent, mucous membranes pink/moist, no drooling or stridor.   NECK: Supple, non-tender  CARDIOVASCULAR: Normal S1, S2; regular rate and rhythm. no rib TTP, no rash.   RESPIRATORY: Normal respiratory effort; breath sounds CTAB, no wheezes, rhonchi, or rales. Speaking in full sentences. No accessory muscle use.   ABDOMEN: Soft; non-tender, non-distended.   EXTREMITIES: Full passive and active ROM in all extremities; non-tender to palpation; distal pulses palpable and symmetric  SKIN: Warm, dry; good skin turgor, no apparent lesions or rashes, no ecchymosis, brisk capillary refill.  NEURO: A&O x3. Sensory and motor functions are grossly intact. Speech is normal. Appearance and judgement seem appropriate for gender and age.

## 2023-11-20 NOTE — ED PROVIDER NOTE - CARE PROVIDER_API CALL
Paul Mary  Pulmonary Disease  [address] [place] [id number]  Phone: [phone]  Fax: [phone]  Follow Up Time: Urgent

## 2023-11-20 NOTE — ED ADULT NURSE NOTE - NSFALLUNIVINTERV_ED_ALL_ED
Bed/Stretcher in lowest position, wheels locked, appropriate side rails in place/Call bell, personal items and telephone in reach/Instruct patient to call for assistance before getting out of bed/chair/stretcher/Non-slip footwear applied when patient is off stretcher/[place] to call system/Physically safe environment - no spills, clutter or unnecessary equipment/Purposeful proactive rounding/Room/bathroom lighting operational, light cord in reach

## 2023-11-20 NOTE — ED PROVIDER NOTE - PATIENT PORTAL LINK FT
You can access the FollowMyHealth Patient Portal offered by [facility] by registering at the following website: [email or web address]. By joining [employer]’s FollowMyHealth portal, you will also be able to view your health information using other applications (apps) compatible with our system.

## 2023-11-20 NOTE — ED PROVIDER NOTE - OBJECTIVE STATEMENT
67-year-old female with past medical history of amyloidosis presents today due to left-sided rib and chest pain.  Patient reports that the pain began yesterday at 11:00 in the morning.  Patient notes that the pain is worsened with deep breathing.  Patient currently rates her pain 8 out of 10.  Patient took Tylenol with minimal relief.  Patient reports that she chronically has lower extremity edema due to kidney disease.  Patient admits to recently having bronchitis and was originally on a Z-Julio and then finished levofloxacin over a week ago.  Patient denies injury, shortness of breath, hemoptysis, palpitations, nausea, vomiting, diarrhea, large hematuria, dysuria or any other complaints.

## 2023-11-20 NOTE — ED PROVIDER NOTE - NS ED ATTENDING STATEMENT MOD
This was a shared visit with the YULIET. I reviewed and verified the documentation and independently performed the documented:

## 2023-11-20 NOTE — ED PROVIDER NOTE - CLINICAL SUMMARY MEDICAL DECISION MAKING FREE TEXT BOX
Patient is a 67-year-old female with a history of multiple cancers including lung status post right lung wedge resection, renal cancer status post heminephrectomy, throat cancer status post hemilaryngectomy and rebuild of vocal cords.  She presents emergency room with pleuritic chest pain on the left side with a cough.  She status post a Z-Julio and currently on Levaquin.  She denies history of recurrent aspiration pneumonia.  She denies recent recent trauma or travel.  She also endorses a history of amyloidosis which is on her kidneys.  She follows at [facility] for both her cancers and her amyloid.    On evaluation is a well-developed well-nourished female no apparent distress.  HEENT reveals status post neck surgery, no respiratory distress.  Speaks with pauses to catch her breath secondary to prior laryngeal surgery.  No stridor no wheezing.  Lungs are clear to auscultation.  Card exam is regular rate and rhythm without murmur rub or gallop.  Abdomen is soft and nontender without guarding or rebound.  Musculoskeletal exam patient has mild bilateral pedal edema which is chronic, no gross orthopedic abnormalities.  Skin exam is normal.  Neurologic exam is normal.    Plan of care includes x-ray imaging which reveals a round lucency in the left lower lung field.  We will obtain a chest CAT scan to further clarify.  Laboratory studies, including D-dimer to rule out PE.  Cardiac monitoring oxygen therapy as needed (patient refused) nebulizer therapy if needed, disposition accordingly.  This chart was made with dictation software and may contain typographical errors.

## 2023-11-20 NOTE — ED PROVIDER NOTE - NSFOLLOWUPINSTRUCTIONS_ED_ALL_ED_FT
Follow up with pulmonology as soon as possible. Return for increased pain, fever, vomiting, shortness of breath, worsening condition. Be cautious when taking Percocet as it may cause drowsiness. Do not drive or operate machinery when taking percocet. Percocet is a narcotic. Narcotics have addictive properties, be cautious when taking this medication. Avoid taking if history of alcohol/drug abuse.       Community Acquired Pneumonia    WHAT YOU NEED TO KNOW:    Community-acquired pneumonia (CAP) is a lung infection that you get outside of a hospital or nursing home setting. Your lungs become inflamed and cannot work well. CAP may be caused by bacteria, viruses, or fungi.  The Lungs    WHILE YOU ARE HERE:    Informed consent is a legal document that explains the tests, treatments, or procedures that you may need. Informed consent means you understand what will be done and can make decisions about what you want. You give your permission when you sign the consent form. You can have someone sign this form for you if you are not able to sign it. You have the right to understand your medical care in words you know. Before you sign the consent form, understand the risks and benefits of what will be done. Make sure all your questions are answered.    Activity: Sit up regularly, or get out of bed to help you breathe easier, and get better faster. Your healthcare provider may want you to do deep breathing and coughing. Deep breathing helps to open the air passages in your lungs. Coughing helps to bring up mucus from your lungs.    An IV is a small tube placed in your vein that is used to give you medicine or liquids.    Medicines:    Antibiotics may be given for at least 5 days to treat a bacterial infection.    Antifungals may be given to treat a fungal infection.    Antivirals may be given to treat a viral infection or to keep your symptoms from becoming severe.    Bronchodilators may be given to help open the air passages in your lungs, and help you breathe more easily.  Tests:    X-ray or CT scan pictures may show a lung infection or other problems, such as fluid around your lungs. You may be given contrast liquid to help your lungs show up better in the pictures. Tell the healthcare provider if you have ever had an allergic reaction to contrast liquid.    A pulse oximeter is a device that measures the amount of oxygen in your blood.  Inpatient Pulse Oximeter      Blood and sputum tests may be done to check for the germ causing your infection.    Bronchoscopy is a procedure to look inside your airway and learn the cause of your airway or lung condition. A bronchoscope (thin tube with a light) is inserted into your mouth and moved down your throat to your airway. You may be given medicine to numb your throat and help you relax during the procedure. Tissue and fluid may be collected from your airway or lungs to be tested.    Nucleic acid-based testing , also called a PCR test, may be used to check for a virus causing your pneumonia. You may need the test if you have severe CAP or a weakened immune system.  Treatment:    Breathing treatments may be used to help open your airways so you can breathe easier. A machine is used to change liquid medicine into a mist. You will breathe the mist into your lungs through tubing and a mouthpiece. Inhaled mist medicines act quickly on your airways and lungs to relieve your symptoms.    You may need extra oxygen if your blood oxygen level is lower than it should be. You may get oxygen through a mask placed over your nose and mouth or through small tubes placed in your nostrils. Ask your healthcare provider before you take off the mask or oxygen tubing.  RISKS:    Your symptoms may get worse. Fluid or infection may get trapped in the lining around your lung. Community-acquired pneumonia may become life-threatening.    CARE AGREEMENT:    You have the right to help plan your care. Learn about your health condition and how it may be treated. Discuss treatment options with your healthcare providers to decide what care you want to receive. You always have the right to refuse treatment.

## 2023-11-20 NOTE — ED ADULT NURSE NOTE - OBJECTIVE STATEMENT
received pt c/o lateral side pain wihtout known injury. pt denies cp/sob/palpitations at this time skin warm and dry denies known injury,.  Safety maintained. BN

## 2023-11-20 NOTE — ED PROVIDER NOTE - ATTENDING APP SHARED VISIT CONTRIBUTION OF CARE
Patient is a 67-year-old female with a history of multiple cancers including lung status post right lung wedge resection, renal cancer status post heminephrectomy, throat cancer status post hemilaryngectomy and rebuild of vocal cords.  She presents emergency room with pleuritic chest pain on the left side with a cough.  She status post a Z-Julio and currently on Levaquin.  She denies history of recurrent aspiration pneumonia.  She denies recent recent trauma or travel.  She also endorses a history of amyloidosis which is on her kidneys.  She follows at [facility] for both her cancers and her amyloid.    On evaluation is a well-developed well-nourished female no apparent distress.  HEENT reveals status post neck surgery, no respiratory distress.  Speaks with pauses to catch her breath secondary to prior laryngeal surgery.  No stridor no wheezing.  Lungs are clear to auscultation.  Card exam is regular rate and rhythm without murmur rub or gallop.  Abdomen is soft and nontender without guarding or rebound.  Musculoskeletal exam patient has mild bilateral pedal edema which is chronic, no gross orthopedic abnormalities.  Skin exam is normal.  Neurologic exam is normal.    Plan of care includes x-ray imaging which reveals a round lucency in the left lower lung field.  We will obtain a chest CAT scan to further clarify.  Laboratory studies, including D-dimer to rule out PE.  Cardiac monitoring oxygen therapy as needed (patient refused) nebulizer therapy if needed, disposition accordingly.  This chart was made with dictation software and may contain typographical errors.

## 2023-11-22 ENCOUNTER — APPOINTMENT (OUTPATIENT)
Dept: PULMONOLOGY | Facility: CLINIC | Age: 68
End: 2023-11-22

## 2023-11-27 ENCOUNTER — APPOINTMENT (OUTPATIENT)
Dept: INTERNAL MEDICINE | Facility: CLINIC | Age: 68
End: 2023-11-27

## 2023-11-28 ENCOUNTER — APPOINTMENT (OUTPATIENT)
Dept: INTERNAL MEDICINE | Facility: CLINIC | Age: 68
End: 2023-11-28
Payer: MEDICARE

## 2023-11-28 VITALS
HEIGHT: 71 IN | BODY MASS INDEX: 30.1 KG/M2 | SYSTOLIC BLOOD PRESSURE: 131 MMHG | WEIGHT: 215 LBS | HEART RATE: 76 BPM | OXYGEN SATURATION: 98 % | DIASTOLIC BLOOD PRESSURE: 70 MMHG

## 2023-11-28 DIAGNOSIS — J18.9 PNEUMONIA, UNSPECIFIED ORGANISM: ICD-10-CM

## 2023-11-28 PROCEDURE — 99214 OFFICE O/P EST MOD 30 MIN: CPT

## 2023-12-07 ENCOUNTER — APPOINTMENT (OUTPATIENT)
Dept: INTERNAL MEDICINE | Facility: CLINIC | Age: 68
End: 2023-12-07
Payer: MEDICARE

## 2023-12-07 VITALS
HEIGHT: 69 IN | SYSTOLIC BLOOD PRESSURE: 160 MMHG | RESPIRATION RATE: 16 BRPM | BODY MASS INDEX: 31.7 KG/M2 | OXYGEN SATURATION: 98 % | DIASTOLIC BLOOD PRESSURE: 85 MMHG | TEMPERATURE: 97.8 F | WEIGHT: 214 LBS | HEART RATE: 96 BPM

## 2023-12-07 DIAGNOSIS — J22 UNSPECIFIED ACUTE LOWER RESPIRATORY INFECTION: ICD-10-CM

## 2023-12-07 DIAGNOSIS — J44.9 CHRONIC OBSTRUCTIVE PULMONARY DISEASE, UNSPECIFIED: ICD-10-CM

## 2023-12-07 DIAGNOSIS — J98.11 ATELECTASIS: ICD-10-CM

## 2023-12-07 DIAGNOSIS — R91.1 SOLITARY PULMONARY NODULE: ICD-10-CM

## 2023-12-07 DIAGNOSIS — R05.9 COUGH, UNSPECIFIED: ICD-10-CM

## 2023-12-07 DIAGNOSIS — Z78.9 OTHER SPECIFIED HEALTH STATUS: ICD-10-CM

## 2023-12-07 DIAGNOSIS — C34.90 MALIGNANT NEOPLASM OF UNSPECIFIED PART OF UNSPECIFIED BRONCHUS OR LUNG: ICD-10-CM

## 2023-12-07 PROCEDURE — 99205 OFFICE O/P NEW HI 60 MIN: CPT | Mod: 25

## 2023-12-07 PROCEDURE — 94729 DIFFUSING CAPACITY: CPT

## 2023-12-07 PROCEDURE — 94727 GAS DIL/WSHOT DETER LNG VOL: CPT

## 2023-12-07 PROCEDURE — ZZZZZ: CPT

## 2023-12-07 PROCEDURE — 94060 EVALUATION OF WHEEZING: CPT

## 2023-12-07 RX ORDER — NYSTATIN 100000 [USP'U]/G
100000 CREAM TOPICAL
Qty: 90 | Refills: 1 | Status: DISCONTINUED | COMMUNITY
Start: 2023-05-30 | End: 2023-12-07

## 2023-12-07 RX ORDER — TIOTROPIUM BROMIDE 18 UG/1
18 CAPSULE ORAL; RESPIRATORY (INHALATION) DAILY
Qty: 30 | Refills: 3 | Status: ACTIVE | COMMUNITY
Start: 2023-12-07 | End: 1900-01-01

## 2023-12-07 RX ORDER — NYSTATIN 100000 U/G
100000 OINTMENT TOPICAL
Qty: 1 | Refills: 5 | Status: DISCONTINUED | COMMUNITY
Start: 2023-05-30 | End: 2023-12-07

## 2024-02-25 NOTE — PHYSICAL EXAM
Goal Outcome Evaluation:    END OF SHIFT SUMMARY     POD # 1     Pt alert and oriented x 4. C/o abdominal pain. On RA, capno in place, saturating 94%. Tachycardic. Nauseous. Up with SBA. Voiding adequately, uses urinal. PIV saline locked. Abdominal incisions x 4 CDI.      [Normal Breath Sounds] : Normal breath sounds [Normal Heart Sounds] : normal heart sounds [2+] : left 2+ [Ankle Swelling (On Exam)] : present [Ankle Swelling On The Left] : moderate [Varicose Veins Of Lower Extremities] : bilaterally [Ankle Swelling On The Right] : mild [Alert] : alert [Oriented to Person] : oriented to person [Oriented to Place] : oriented to place [Oriented to Time] : oriented to time [Calm] : calm [JVD] : no jugular venous distention  [Carotid Bruits] : no carotid bruits [] : not present [Abdomen Masses] : No abdominal masses [Purpura] : no purpura  [Petechiae] : no petechiae [Skin Ulcer] : no ulcer [Skin Induration] : no induration [de-identified] : Well appearing

## 2024-03-06 ENCOUNTER — APPOINTMENT (OUTPATIENT)
Dept: INTERNAL MEDICINE | Facility: CLINIC | Age: 69
End: 2024-03-06

## 2024-04-23 ENCOUNTER — APPOINTMENT (OUTPATIENT)
Dept: UROLOGY | Facility: CLINIC | Age: 69
End: 2024-04-23
Payer: MEDICARE

## 2024-04-23 ENCOUNTER — OUTPATIENT (OUTPATIENT)
Dept: OUTPATIENT SERVICES | Facility: HOSPITAL | Age: 69
LOS: 1 days | End: 2024-04-23
Payer: MEDICARE

## 2024-04-23 VITALS
OXYGEN SATURATION: 99 % | SYSTOLIC BLOOD PRESSURE: 156 MMHG | HEIGHT: 71 IN | WEIGHT: 216 LBS | DIASTOLIC BLOOD PRESSURE: 78 MMHG | BODY MASS INDEX: 30.24 KG/M2 | HEART RATE: 85 BPM

## 2024-04-23 DIAGNOSIS — R35.0 FREQUENCY OF MICTURITION: ICD-10-CM

## 2024-04-23 DIAGNOSIS — C67.9 MALIGNANT NEOPLASM OF BLADDER, UNSPECIFIED: ICD-10-CM

## 2024-04-23 DIAGNOSIS — Z98.890 OTHER SPECIFIED POSTPROCEDURAL STATES: Chronic | ICD-10-CM

## 2024-04-23 DIAGNOSIS — Z90.02 ACQUIRED ABSENCE OF LARYNX: Chronic | ICD-10-CM

## 2024-04-23 DIAGNOSIS — N32.89 OTHER SPECIFIED DISORDERS OF BLADDER: ICD-10-CM

## 2024-04-23 PROCEDURE — 52000 CYSTOURETHROSCOPY: CPT

## 2024-04-24 ENCOUNTER — OUTPATIENT (OUTPATIENT)
Dept: OUTPATIENT SERVICES | Facility: HOSPITAL | Age: 69
LOS: 1 days | End: 2024-04-24

## 2024-04-24 VITALS
HEIGHT: 69.5 IN | DIASTOLIC BLOOD PRESSURE: 82 MMHG | HEART RATE: 82 BPM | SYSTOLIC BLOOD PRESSURE: 130 MMHG | OXYGEN SATURATION: 98 % | WEIGHT: 216.05 LBS | TEMPERATURE: 98 F | RESPIRATION RATE: 16 BRPM

## 2024-04-24 DIAGNOSIS — Z98.890 OTHER SPECIFIED POSTPROCEDURAL STATES: Chronic | ICD-10-CM

## 2024-04-24 DIAGNOSIS — Z90.5 ACQUIRED ABSENCE OF KIDNEY: Chronic | ICD-10-CM

## 2024-04-24 DIAGNOSIS — Z90.02 ACQUIRED ABSENCE OF LARYNX: Chronic | ICD-10-CM

## 2024-04-24 DIAGNOSIS — I10 ESSENTIAL (PRIMARY) HYPERTENSION: ICD-10-CM

## 2024-04-24 DIAGNOSIS — C67.9 MALIGNANT NEOPLASM OF BLADDER, UNSPECIFIED: ICD-10-CM

## 2024-04-24 DIAGNOSIS — N32.89 OTHER SPECIFIED DISORDERS OF BLADDER: ICD-10-CM

## 2024-04-24 LAB
ANION GAP SERPL CALC-SCNC: 16 MMOL/L — HIGH (ref 7–14)
BACTERIA UR CULT: NORMAL
BUN SERPL-MCNC: 31 MG/DL — HIGH (ref 7–23)
CALCIUM SERPL-MCNC: 8.9 MG/DL — SIGNIFICANT CHANGE UP (ref 8.4–10.5)
CHLORIDE SERPL-SCNC: 103 MMOL/L — SIGNIFICANT CHANGE UP (ref 98–107)
CO2 SERPL-SCNC: 23 MMOL/L — SIGNIFICANT CHANGE UP (ref 22–31)
CREAT SERPL-MCNC: 1.39 MG/DL — HIGH (ref 0.5–1.3)
EGFR: 41 ML/MIN/1.73M2 — LOW
GLUCOSE SERPL-MCNC: 76 MG/DL — SIGNIFICANT CHANGE UP (ref 70–99)
HCT VFR BLD CALC: 35 % — SIGNIFICANT CHANGE UP (ref 34.5–45)
HGB BLD-MCNC: 11.7 G/DL — SIGNIFICANT CHANGE UP (ref 11.5–15.5)
MCHC RBC-ENTMCNC: 30.3 PG — SIGNIFICANT CHANGE UP (ref 27–34)
MCHC RBC-ENTMCNC: 33.4 GM/DL — SIGNIFICANT CHANGE UP (ref 32–36)
MCV RBC AUTO: 90.7 FL — SIGNIFICANT CHANGE UP (ref 80–100)
NRBC # BLD: 0 /100 WBCS — SIGNIFICANT CHANGE UP (ref 0–0)
NRBC # FLD: 0 K/UL — SIGNIFICANT CHANGE UP (ref 0–0)
PLATELET # BLD AUTO: 208 K/UL — SIGNIFICANT CHANGE UP (ref 150–400)
POTASSIUM SERPL-MCNC: 5.1 MMOL/L — SIGNIFICANT CHANGE UP (ref 3.5–5.3)
POTASSIUM SERPL-SCNC: 5.1 MMOL/L — SIGNIFICANT CHANGE UP (ref 3.5–5.3)
RBC # BLD: 3.86 M/UL — SIGNIFICANT CHANGE UP (ref 3.8–5.2)
RBC # FLD: 13.2 % — SIGNIFICANT CHANGE UP (ref 10.3–14.5)
SODIUM SERPL-SCNC: 142 MMOL/L — SIGNIFICANT CHANGE UP (ref 135–145)
WBC # BLD: 4.95 K/UL — SIGNIFICANT CHANGE UP (ref 3.8–10.5)
WBC # FLD AUTO: 4.95 K/UL — SIGNIFICANT CHANGE UP (ref 3.8–10.5)

## 2024-04-24 RX ORDER — ERGOCALCIFEROL 1.25 MG/1
1 CAPSULE ORAL
Qty: 0 | Refills: 0 | DISCHARGE

## 2024-04-24 RX ORDER — ASPIRIN/CALCIUM CARB/MAGNESIUM 324 MG
1 TABLET ORAL
Refills: 0 | DISCHARGE

## 2024-04-24 RX ORDER — SODIUM CHLORIDE 9 MG/ML
1000 INJECTION, SOLUTION INTRAVENOUS
Refills: 0 | Status: DISCONTINUED | OUTPATIENT
Start: 2024-05-01 | End: 2024-05-15

## 2024-04-24 NOTE — H&P PST ADULT - I HAVE PERSONALLY SEEN AND EXAMINED THE PATIENT. THERE HAVE NOT BEEN ANY CHANGES IN THE PATIENT'S HISTORY OR EXAM UNLESS COMMENTED BELOW
Addended by: SONIA MCKINLEY on: 7/12/2023 05:50 PM     Modules accepted: Orders     Statement Selected

## 2024-04-24 NOTE — H&P PST ADULT - NSICDXPASTSURGICALHX_GEN_ALL_CORE_FT
PAST SURGICAL HISTORY:   delivery delivered 1985    H/O partial laryngectomy     H/O partial nephrectomy

## 2024-04-24 NOTE — H&P PST ADULT - ENMT COMMENTS
Pt c/o of chronic hoarseness r/t surgery for throat ca age 29 - denies dysphagia - Anesthesia alert for Restrictive Airway Pt c/o of chronic hoarseness r/t surgery for throat ca age 29 - denies dysphagia - Anesthesia alert for Restrictive Airway with smaller instrumentation

## 2024-04-24 NOTE — H&P PST ADULT - HISTORY OF PRESENT ILLNESS
Patient is a 63 year old female with a history of Left varicose vein ablation in February 2019; found to have a Right renal mass found incidentally on Ct scan. Patient was referred to Dr. Dinero for an evaluation. Pre op diagnosis: Other specified disorders of kidney and ureter. Patient is scheduled for Right laparoscopic partial nephrectomy, possible radical, possible open scheduled on 7/10/2019. Pt is a 68 yr old female scheduled for Cystoscopy Ureteroscopy with B/L Retrograde Transurethral Resection of Bladder Tumor with Dr Dinero  5/1/24 - pt with hx partial nephrectomy 2019 with regular checks - now with bladder lesion - hx surgery for throat ca and reconstruction of vocal cords - causing Restrictive Airway and need for smaller instrumentation ( Dr Cifuentes spoke with patient ) - hx amyloidosis mainly affecting kidneys , HTN, HLD -    Pt is a 68 yr old female scheduled for Cystoscopy Ureteroscopy with B/L Retrograde Transurethral Resection of Bladder Tumor with Dr Dinero  5/1/24 - pt with hx partial nephrectomy 2019 with regular checks - now with bladder lesion - hx surgery for throat ca and reconstruction of vocal cords - causing Restrictive Airway and need for smaller instrumentation ( Dr Cifuentes spoke with patient ) - hx amyloidosis mainly affecting kidneys , HTN, HLD -   Cardio note requested and in chart with Echo report - ( pt EF 62% 1/24) - pt seen 12/23 by Dr Mitchell and cardiac status stable

## 2024-04-24 NOTE — H&P PST ADULT - GENITOURINARY COMMENTS
Hx not examined Hx renal cell ca with partial right nephrectomy 2019 - pt now with new bladder lesion

## 2024-04-24 NOTE — H&P PST ADULT - PROBLEM SELECTOR PLAN 1
Pt scheduled for surgery and preop instructions including instructions for taking Famotidine  on the day of surgery, given verbally and with use of  written materials, and patient confirming understanding of such instructions using  teach back method.  Cardio info requested - Echo and LVN/EKG in chart - hx amyloidosis Pt scheduled for surgery and preop instructions including instructions for taking Famotidine  on the day of surgery, given verbally and with use of  written materials, and patient confirming understanding of such instructions using  teach back method.  Cardio info requested - Echo and LVN/EKG in chart - hx amyloidosis  Spoke with Dr Cifuentes who then spoke with patient concerning her past hx of throat ca surgery and resulting "Restrictive Airway " as per patient - Dr Cifuentes to put alert in

## 2024-04-24 NOTE — H&P PST ADULT - NSICDXPASTMEDICALHX_GEN_ALL_CORE_FT
PAST MEDICAL HISTORY:  Bladder tumor     Chronic kidney disease (CKD)     Other specified disorders of kidney and ureter     Restrictive airway disease     Varicose veins     Vocal cord cancer 1985     PAST MEDICAL HISTORY:  Bladder tumor     Chronic kidney disease (CKD)     H/O amyloidosis     Other specified disorders of kidney and ureter     Restrictive airway disease     Varicose veins     Vocal cord cancer 1985

## 2024-04-26 ENCOUNTER — APPOINTMENT (OUTPATIENT)
Dept: UROLOGY | Facility: CLINIC | Age: 69
End: 2024-04-26

## 2024-04-26 LAB
CULTURE RESULTS: SIGNIFICANT CHANGE UP
SPECIMEN SOURCE: SIGNIFICANT CHANGE UP

## 2024-04-30 ENCOUNTER — TRANSCRIPTION ENCOUNTER (OUTPATIENT)
Age: 69
End: 2024-04-30

## 2024-04-30 NOTE — ASU PATIENT PROFILE, ADULT - ABILITY TO HEAR (WITH HEARING AID OR HEARING APPLIANCE IF NORMALLY USED):
What Type Of Note Output Would You Prefer (Optional)?: Standard Output Is The Patient Presenting As Previously Scheduled?: Yes How Severe Is Your Rash?: moderate Is This A New Presentation, Or A Follow-Up?: Rash Adequate: hears normal conversation without difficulty

## 2024-04-30 NOTE — ASU PATIENT PROFILE, ADULT - FALL HARM RISK - UNIVERSAL INTERVENTIONS
DISPLAY PLAN FREE TEXT
Bed in lowest position, wheels locked, appropriate side rails in place/Call bell, personal items and telephone in reach/Instruct patient to call for assistance before getting out of bed or chair/Non-slip footwear when patient is out of bed/[place] to call system/Physically safe environment - no spills, clutter or unnecessary equipment/Purposeful Proactive Rounding/Room/bathroom lighting operational, light cord in reach

## 2024-04-30 NOTE — ASU PATIENT PROFILE, ADULT - NS PRO MODE OF ARRIVAL
According to visit note from 5/19 plan is to change to Valsartan and Amlodipine written as separate scripts if the valsartan-hydrochlorothiazide did not work. Please advise.
Began PA on CoverMyMeds today.
Patient states Valsartan is not working and he would like to go back to [place], states PCP mentioned she would need to write a letter for coverage to [employer] [facility] , requesting she do so and give him a call once this is completed, he can be reached at [phone]
Spoke with patient and he said he's already tried taking the medications separately and did not have good control of his BP. Advised pt we will have to fill out a prior authorization for the [employer].
Yes- I sent in two different scripts and explain to patient that is what we need to- separate the meds as they will not allow us to fill it together as brand name and if he fails that we can try the PA but it may not work
ambulatory

## 2024-05-01 ENCOUNTER — OUTPATIENT (OUTPATIENT)
Dept: OUTPATIENT SERVICES | Facility: HOSPITAL | Age: 69
LOS: 1 days | Discharge: HOME | End: 2024-05-01
Payer: MEDICARE

## 2024-05-01 ENCOUNTER — APPOINTMENT (OUTPATIENT)
Dept: UROLOGY | Facility: HOSPITAL | Age: 69
End: 2024-05-01

## 2024-05-01 ENCOUNTER — RESULT REVIEW (OUTPATIENT)
Age: 69
End: 2024-05-01

## 2024-05-01 ENCOUNTER — TRANSCRIPTION ENCOUNTER (OUTPATIENT)
Age: 69
End: 2024-05-01

## 2024-05-01 VITALS
SYSTOLIC BLOOD PRESSURE: 158 MMHG | TEMPERATURE: 99 F | OXYGEN SATURATION: 97 % | HEART RATE: 84 BPM | DIASTOLIC BLOOD PRESSURE: 68 MMHG | RESPIRATION RATE: 16 BRPM | HEIGHT: 70 IN | WEIGHT: 220.02 LBS

## 2024-05-01 VITALS
SYSTOLIC BLOOD PRESSURE: 147 MMHG | OXYGEN SATURATION: 98 % | HEART RATE: 77 BPM | RESPIRATION RATE: 17 BRPM | DIASTOLIC BLOOD PRESSURE: 68 MMHG

## 2024-05-01 DIAGNOSIS — Z90.5 ACQUIRED ABSENCE OF KIDNEY: Chronic | ICD-10-CM

## 2024-05-01 DIAGNOSIS — N32.89 OTHER SPECIFIED DISORDERS OF BLADDER: ICD-10-CM

## 2024-05-01 DIAGNOSIS — Z90.02 ACQUIRED ABSENCE OF LARYNX: Chronic | ICD-10-CM

## 2024-05-01 PROCEDURE — 52234 CYSTOSCOPY AND TREATMENT: CPT

## 2024-05-01 PROCEDURE — 52005 CYSTO W/URTRL CATHJ: CPT | Mod: 59

## 2024-05-01 PROCEDURE — 74420 UROGRAPHY RTRGR +-KUB: CPT | Mod: 26

## 2024-05-01 PROCEDURE — 88307 TISSUE EXAM BY PATHOLOGIST: CPT | Mod: 26

## 2024-05-01 DEVICE — GUIDEWIRE SENSOR DUAL-FLEX NITINOL STRAIGHT .035" X 150CM
Type: IMPLANTABLE DEVICE | Status: NON-FUNCTIONAL
Removed: 2024-05-01

## 2024-05-01 DEVICE — URETERAL CATH OPEN END 5FR 70CM
Type: IMPLANTABLE DEVICE | Status: NON-FUNCTIONAL
Removed: 2024-05-01

## 2024-05-01 RX ORDER — ASPIRIN/CALCIUM CARB/MAGNESIUM 324 MG
1 TABLET ORAL
Refills: 0 | DISCHARGE

## 2024-05-01 RX ORDER — ONDANSETRON 8 MG/1
4 TABLET, FILM COATED ORAL ONCE
Refills: 0 | Status: DISCONTINUED | OUTPATIENT
Start: 2024-05-01 | End: 2024-05-15

## 2024-05-01 RX ORDER — CEPHALEXIN 500 MG
1 CAPSULE ORAL
Qty: 12 | Refills: 0
Start: 2024-05-01 | End: 2024-05-03

## 2024-05-01 RX ORDER — ROSUVASTATIN CALCIUM 5 MG/1
1 TABLET ORAL
Refills: 0 | DISCHARGE

## 2024-05-01 RX ORDER — METOPROLOL TARTRATE 50 MG
1 TABLET ORAL
Refills: 0 | DISCHARGE

## 2024-05-01 RX ORDER — IRBESARTAN 75 MG/1
1 TABLET ORAL
Refills: 0 | DISCHARGE

## 2024-05-01 RX ORDER — SODIUM CHLORIDE 9 MG/ML
1000 INJECTION, SOLUTION INTRAVENOUS
Refills: 0 | Status: DISCONTINUED | OUTPATIENT
Start: 2024-05-01 | End: 2024-05-15

## 2024-05-01 RX ORDER — FENTANYL CITRATE 50 UG/ML
25 INJECTION INTRAVENOUS
Refills: 0 | Status: DISCONTINUED | OUTPATIENT
Start: 2024-05-01 | End: 2024-05-01

## 2024-05-01 RX ORDER — ACETAMINOPHEN 500 MG
1000 TABLET ORAL EVERY 6 HOURS
Refills: 0 | Status: DISCONTINUED | OUTPATIENT
Start: 2024-05-01 | End: 2024-05-15

## 2024-05-01 RX ORDER — FENTANYL CITRATE 50 UG/ML
50 INJECTION INTRAVENOUS
Refills: 0 | Status: DISCONTINUED | OUTPATIENT
Start: 2024-05-01 | End: 2024-05-01

## 2024-05-01 RX ORDER — CALCIUM CARBONATE 500(1250)
2 TABLET ORAL
Refills: 0 | DISCHARGE

## 2024-05-01 RX ORDER — OXYCODONE HYDROCHLORIDE 5 MG/1
5 TABLET ORAL ONCE
Refills: 0 | Status: DISCONTINUED | OUTPATIENT
Start: 2024-05-01 | End: 2024-05-01

## 2024-05-01 NOTE — ASU DISCHARGE PLAN (ADULT/PEDIATRIC) - ASU DC SPECIAL INSTRUCTIONSFT
Discharge Instructions: TURBT    · General: It is common to have blood in the urine after your procedure. It may be pink or even red; inform your doctor if you have a significant amount of clot in the urine or if you are unable to void at all or if your catheter stops draining. It is not uncommon to have some burning when you urinate, this will gradually improve. With a catheter in place, it is not uncommon to have occasional leakage of urine or blood around the catheter. Please call your urologist if this is excessive and/or the urine is not draining through the catheter into the bag.    · Bathing: You may shower or bathe. If going home with [place], shower only until catheter is removed.    · Diet: You may resume your regular diet and regular medication regimen.    · Pain: You may take Tylenol (acetaminophen) 650-975mg and/or Motrin (ibuprofen) 400-600mg, available over the counter, for pain every 6 hours as needed. Do not exceed 4000 milligrams of Tylenol (acetaminophen) daily. You may alternate these medications such that you take either one every 3 hours.    · Antibiotics: You have been given a prescription for an antibiotic, please take this medication as instructed and be sure to complete entire course.    · Stool softeners: Do not allow yourself to become constipated as straining will cause bleeding. Take stool softeners (ex. Colace) or a laxative (ex. Senekot, ExLax), available over the counter, if needed.    · Activity: No heavy lifting or strenuous exercise until you are evaluated at your post-operative appointment. Otherwise, you may return to your usual level of activity.    · Anticoagulation: If you are taking any blood thinning medications, please discuss with your urologist prior to restarting these medications unless otherwise specified.    · Follow-up: If you did not already schedule your post-operative appointment, please call your urologist to schedule a follow-up appointment.    · Call your urologist if: You have any bleeding that does not stop, inability to void >8 hours, fever over 100.4 F, chills, persistent nausea/vomiting, or if your pain is not controlled on your discharge pain medications.

## 2024-05-01 NOTE — ASU DISCHARGE PLAN (ADULT/PEDIATRIC) - CARE PROVIDER_API CALL
Joel Dinero  Urology  [address] [place] [address], [place] [id number]  [place] [address]  Phone: [phone]  Fax: [phone]  Follow Up Time: 1 week

## 2024-05-01 NOTE — ASU DISCHARGE PLAN (ADULT/PEDIATRIC) - NURSING INSTRUCTIONS
Follow up with MD. Continue all medications as prescribed. You received IV Tylenol for pain management. Please DO NOT take any Tylenol (Acetaminophen) containing products, such as Vicodin, Percocet, Excedrin, and cold medications for the next 6 hours (until 9:00PM). DO NOT TAKE MORE THAN 3000 MG OF TYLENOL in a 24 hour period. Drink fluids throughout the day, avoid anything greasy or spicy as anesthesia can cause nausea/vomiting. Call MD office for post op appointment. Continue all medications as prescribed. You received IV Tylenol for pain management. Please DO NOT take any Tylenol (Acetaminophen) containing products, such as Vicodin, Percocet, Excedrin, and cold medications for the next 6 hours (until 9:00PM). DO NOT TAKE MORE THAN 3000 MG OF TYLENOL in a 24 hour period. Complete antibiotic Keflex as directed. Drink fluids throughout the day, avoid anything greasy or spicy as anesthesia can cause nausea/vomiting.

## 2024-05-01 NOTE — ASU DISCHARGE PLAN (ADULT/PEDIATRIC) - FOLLOW UP APPOINTMENTS
may also call Recovery Room (PACU) 24/7 @ [phone]/[facility] [place] [facility]

## 2024-05-01 NOTE — ASU DISCHARGE PLAN (ADULT/PEDIATRIC) - NS MD DC FALL RISK RISK
For information on Fall & Injury Prevention, visit: [email or web address] OR  [email or web address] OR  https://www.cdc.gov/steadi/patient.html

## 2024-05-01 NOTE — BRIEF OPERATIVE NOTE - ANTIBIOTIC PROTOCOL
Followed protocol Products Recommended: EltaMD General Sunscreen Counseling: I recommended a broad spectrum sunscreen with a SPF of 30 or higher.  I explained that SPF 30 sunscreens block approximately 97 percent of the sun's harmful rays.  Sunscreens should be applied at least 15 minutes prior to expected sun exposure and then every 2 hours after that as long as sun exposure continues. If swimming or exercising sunscreen should be reapplied every 45 minutes to an hour after getting wet or sweating.  One ounce, or the equivalent of a shot glass full of sunscreen, is adequate to protect the skin not covered by a bathing suit. I also recommended a lip balm with a sunscreen as well. Sun protective clothing can be used in lieu of sunscreen but must be worn the entire time you are exposed to the sun's rays. Detail Level: Generalized

## 2024-05-04 ENCOUNTER — EMERGENCY (EMERGENCY)
Facility: HOSPITAL | Age: 69
LOS: 1 days | Discharge: HOME | End: 2024-05-04
Attending: EMERGENCY MEDICINE | Admitting: EMERGENCY MEDICINE
Payer: MEDICARE

## 2024-05-04 VITALS
TEMPERATURE: 99 F | OXYGEN SATURATION: 98 % | HEART RATE: 86 BPM | RESPIRATION RATE: 18 BRPM | DIASTOLIC BLOOD PRESSURE: 85 MMHG | SYSTOLIC BLOOD PRESSURE: 175 MMHG

## 2024-05-04 VITALS
OXYGEN SATURATION: 97 % | TEMPERATURE: 95 F | WEIGHT: 199.96 LBS | HEIGHT: 70 IN | HEART RATE: 94 BPM | SYSTOLIC BLOOD PRESSURE: 179 MMHG | DIASTOLIC BLOOD PRESSURE: 93 MMHG | RESPIRATION RATE: 18 BRPM

## 2024-05-04 DIAGNOSIS — Z90.5 ACQUIRED ABSENCE OF KIDNEY: Chronic | ICD-10-CM

## 2024-05-04 DIAGNOSIS — Z90.02 ACQUIRED ABSENCE OF LARYNX: Chronic | ICD-10-CM

## 2024-05-04 PROBLEM — Z86.39 PERSONAL HISTORY OF OTHER ENDOCRINE, NUTRITIONAL AND METABOLIC DISEASE: Chronic | Status: ACTIVE | Noted: 2024-04-24

## 2024-05-04 PROBLEM — D49.4 NEOPLASM OF UNSPECIFIED BEHAVIOR OF BLADDER: Chronic | Status: ACTIVE | Noted: 2024-04-24

## 2024-05-04 PROBLEM — N18.9 CHRONIC KIDNEY DISEASE, UNSPECIFIED: Chronic | Status: ACTIVE | Noted: 2024-04-24

## 2024-05-04 PROCEDURE — 70450 CT HEAD/BRAIN W/O DYE: CPT | Mod: MC

## 2024-05-04 PROCEDURE — 99284 EMERGENCY DEPT VISIT MOD MDM: CPT | Mod: 25

## 2024-05-04 PROCEDURE — 99285 EMERGENCY DEPT VISIT HI MDM: CPT

## 2024-05-04 PROCEDURE — 96375 TX/PRO/DX INJ NEW DRUG ADDON: CPT

## 2024-05-04 PROCEDURE — 96361 HYDRATE IV INFUSION ADD-ON: CPT

## 2024-05-04 PROCEDURE — 96374 THER/PROPH/DIAG INJ IV PUSH: CPT

## 2024-05-04 PROCEDURE — 70450 CT HEAD/BRAIN W/O DYE: CPT | Mod: 26,MC

## 2024-05-04 RX ORDER — KETOROLAC TROMETHAMINE 30 MG/ML
15 SYRINGE (ML) INJECTION ONCE
Refills: 0 | Status: DISCONTINUED | OUTPATIENT
Start: 2024-05-04 | End: 2024-05-04

## 2024-05-04 RX ORDER — SODIUM CHLORIDE 9 MG/ML
1000 INJECTION INTRAMUSCULAR; INTRAVENOUS; SUBCUTANEOUS ONCE
Refills: 0 | Status: COMPLETED | OUTPATIENT
Start: 2024-05-04 | End: 2024-05-04

## 2024-05-04 RX ORDER — DIAZEPAM 5 MG
5 TABLET ORAL ONCE
Refills: 0 | Status: DISCONTINUED | OUTPATIENT
Start: 2024-05-04 | End: 2024-05-04

## 2024-05-04 RX ORDER — METOCLOPRAMIDE HCL 10 MG
10 TABLET ORAL ONCE
Refills: 0 | Status: COMPLETED | OUTPATIENT
Start: 2024-05-04 | End: 2024-05-04

## 2024-05-04 RX ADMIN — Medication 5 MILLIGRAM(S): at 09:32

## 2024-05-04 RX ADMIN — Medication 15 MILLIGRAM(S): at 09:33

## 2024-05-04 RX ADMIN — SODIUM CHLORIDE 1000 MILLILITER(S): 9 INJECTION INTRAMUSCULAR; INTRAVENOUS; SUBCUTANEOUS at 11:01

## 2024-05-04 RX ADMIN — Medication 15 MILLIGRAM(S): at 09:30

## 2024-05-04 RX ADMIN — SODIUM CHLORIDE 1000 MILLILITER(S): 9 INJECTION INTRAMUSCULAR; INTRAVENOUS; SUBCUTANEOUS at 09:32

## 2024-05-04 RX ADMIN — Medication 10 MILLIGRAM(S): at 09:33

## 2024-05-04 NOTE — ED ADULT NURSE REASSESSMENT NOTE - NS ED NURSE REASSESS COMMENT FT1
Rounded on patient, patient feels comfortable after receiving pain medications- second blanket offered to patient, which patient did not want at this time- patient provided pillow. Call bell provided to patient and explained need to patient to use in case she needs help.

## 2024-05-04 NOTE — ED ADULT NURSE NOTE - OBJECTIVE STATEMENT
patient presents to ED with 6/10 pain. patient states since last week she has had a migraine that radiates through her left eye, left inner mouth/jaw and down the left side of her neck.  patient had a procedure last week on her bladder but this migraine has been Since before the procedure.

## 2024-05-04 NOTE — ED PROVIDER NOTE - NSICDXPASTMEDICALHX_GEN_ALL_CORE_FT
PAST MEDICAL HISTORY:  Bladder tumor     Chronic kidney disease (CKD)     H/O amyloidosis     Other specified disorders of kidney and ureter     Restrictive airway disease     Varicose veins     Vocal cord cancer 1985

## 2024-05-04 NOTE — ED PROVIDER NOTE - CARE PROVIDER_API CALL
Amandeep Andrea  Neurology  [address] [place] [address]  Phone: [phone]  Fax: [phone]  Follow Up Time:

## 2024-05-04 NOTE — ED PROVIDER NOTE - DIFFERENTIAL DIAGNOSIS
Differential diagnosis includes subarachnoid hemorrhage possible tumor or other mass space-occupying lesion possible although doubt meningitis doubt Differential Diagnosis

## 2024-05-04 NOTE — ED ADULT TRIAGE NOTE - CHIEF COMPLAINT QUOTE
Patient states since Monday she woke up with a headache that she feel in her neck and radiating to her left eye. Denies injury, trauma, fall. States she has hypertension but her blood pressure has been stable. Denies ACT. On Wednesday May 1st patient had bladder biopsy but states headache began before procedure.

## 2024-05-04 NOTE — ED PROVIDER NOTE - NPI NUMBER (FOR SYS[employer] USE ONLY) :
[[id number]] Manual Repair Warning Statement: We plan on removing the manually selected variable below in favor of our much easier automatic structured text blocks found in the previous tab. We decided to do this to help make the flow better and give you the full power of structured data. Manual selection is never going to be ideal in our platform and I would encourage you to avoid using manual selection from this point on, especially since I will be sunsetting this feature. It is important that you do one of two things with the customized text below. First, you can save all of the text in a word file so you can have it for future reference. Second, transfer the text to the appropriate area in the Library tab. Lastly, if there is a flap or graft type which we do not have you need to let us know right away so I can add it in before the variable is hidden. No need to panic, we plan to give you roughly 6 months to make the change.

## 2024-05-04 NOTE — ED PROVIDER NOTE - OBJECTIVE STATEMENT
Patient is a 68-year-old white female with a history of amyloidosis chronic kidney disease laryngeal carcinoma 1985 and bladder tumor status post recent biopsy now presents to the emergency department here at [facility] complaining of headache.  Patient states that she had a gradual onset of headache 1 week ago which began at the base of her head radiating upwards through her occipital scalp around to the frontal region.  There was no associated fevers chills vomiting diarrhea sore throat chest pain abdominal pain or back pain.  No frequency urgency hesitancy or dysuria.  No hematuria.  Headache seems to get worse when she flexes head and is better when extends head.  No back pain.  No recent infections or infectious symptomatology.  States that she does not get headaches often but when she does they usually only last for a few hours.

## 2024-05-04 NOTE — ED PROVIDER NOTE - PATIENT PORTAL LINK FT
You can access the FollowMyHealth Patient Portal offered by [facility] by registering at the following website: [email or web address]. By joining [facility]’s FollowMyHealth portal, you will also be able to view your health information using other applications (apps) compatible with our system.

## 2024-05-04 NOTE — ED PROVIDER NOTE - CLINICAL SUMMARY MEDICAL DECISION MAKING FREE TEXT BOX
1 week continuous occipital radiating to frontal region headache requiring thorough evaluation CT neuroimaging as well as IV fluids medications.

## 2024-05-04 NOTE — ED PROVIDER NOTE - PHYSICAL EXAMINATION
Examination reveals a well-developed well-nourished older white female in mild distress secondary to headache.  HEENT normocephalic atraumatic pupils are equal round reactive to light and accommodation extraocular movements are intact neck is supple no meningismus.  There is reproducible tenderness to palpation over the insertion of the right greater than left sternomastoid muscle near base of occipital skull/scalp area.  Lungs are clear heart regular rate and rhythm without any murmurs rubs gallops abdomen soft nontender positive bowel sounds neurologically alert oriented x 4 without any focal neurologic deficits.

## 2024-05-04 NOTE — ED PROVIDER NOTE - CHPI ED SYMPTOMS NEG
34w5d no blurred vision/no confusion/no fever/no numbness/no weakness/no change in level of consciousness

## 2024-05-04 NOTE — ED PROVIDER NOTE - NSFOLLOWUPINSTRUCTIONS_ED_ALL_ED_FT
Rest.  Increase fluid intake.  Take Motrin 600 mg 4 times a day if needed for headache.  Return here if needed.

## 2024-05-07 ENCOUNTER — NON-APPOINTMENT (OUTPATIENT)
Age: 69
End: 2024-05-07

## 2024-05-07 LAB — SURGICAL PATHOLOGY STUDY: SIGNIFICANT CHANGE UP

## 2024-05-10 ENCOUNTER — APPOINTMENT (OUTPATIENT)
Dept: INTERNAL MEDICINE | Facility: CLINIC | Age: 69
End: 2024-05-10
Payer: MEDICARE

## 2024-05-10 VITALS
OXYGEN SATURATION: 96 % | SYSTOLIC BLOOD PRESSURE: 146 MMHG | WEIGHT: 220 LBS | HEART RATE: 91 BPM | HEIGHT: 71 IN | DIASTOLIC BLOOD PRESSURE: 70 MMHG | BODY MASS INDEX: 30.8 KG/M2

## 2024-05-10 DIAGNOSIS — R51.9 HEADACHE, UNSPECIFIED: ICD-10-CM

## 2024-05-10 DIAGNOSIS — M54.12 RADICULOPATHY, CERVICAL REGION: ICD-10-CM

## 2024-05-10 PROCEDURE — 99214 OFFICE O/P EST MOD 30 MIN: CPT

## 2024-05-10 NOTE — HEALTH RISK ASSESSMENT
[0] : 2) Feeling down, depressed, or hopeless: Not at all (0) [PHQ-2 Negative - No further assessment needed] : PHQ-2 Negative - No further assessment needed [de-identified] : Social [[id number]] : 0 [Former] : Former

## 2024-05-10 NOTE — PLAN
[FreeTextEntry1] : Because of her symptoms is unclear ?  Cervical radicular pain.  Will get an MRI of the C-spine She has gabapentin at home which she will try when she has a headache.

## 2024-05-10 NOTE — REVIEW OF SYSTEMS
[Sore Throat] : no sore throat [Postnasal Drip] : postnasal drip [Negative] : Genitourinary [FreeTextEntry2] : as noted in HPI  [FreeTextEntry6] : as noted in HPI  [FreeTextEntry9] : as noted in HPI

## 2024-05-10 NOTE — HISTORY OF PRESENT ILLNESS
[FreeTextEntry1] : f/u pneumonia [de-identified] : 68 y/o female with remote h/o laryngeal cancer with airway stricture, kidney cancer s/p partial nephrectomy, amyloidosis with proteinuria and chronic lower extremity edema, non-small cell lung cancer, hypertension,  hyperlipidemia and recently diagnosed bladder cancer  Since the procedure for the bladder she can open her mouth all the way.  She has jaw pain.  She has headaches.  She saw her neurologist who did an MRI which was normal. When she has a headache she has to sleep sitting up.  Headache radiates into her ear and into her jaw.

## 2024-05-28 ENCOUNTER — NON-APPOINTMENT (OUTPATIENT)
Age: 69
End: 2024-05-28

## 2024-08-20 ENCOUNTER — APPOINTMENT (OUTPATIENT)
Dept: UROLOGY | Facility: CLINIC | Age: 69
End: 2024-08-20

## 2024-09-17 ENCOUNTER — APPOINTMENT (OUTPATIENT)
Dept: UROLOGY | Facility: CLINIC | Age: 69
End: 2024-09-17
Payer: MEDICARE

## 2024-09-17 ENCOUNTER — OUTPATIENT (OUTPATIENT)
Dept: OUTPATIENT SERVICES | Facility: HOSPITAL | Age: 69
LOS: 1 days | End: 2024-09-17
Payer: MEDICARE

## 2024-09-17 VITALS
HEART RATE: 96 BPM | DIASTOLIC BLOOD PRESSURE: 86 MMHG | RESPIRATION RATE: 17 BRPM | BODY MASS INDEX: 29.68 KG/M2 | SYSTOLIC BLOOD PRESSURE: 160 MMHG | HEIGHT: 71 IN | WEIGHT: 212 LBS

## 2024-09-17 DIAGNOSIS — Z90.02 ACQUIRED ABSENCE OF LARYNX: Chronic | ICD-10-CM

## 2024-09-17 DIAGNOSIS — Z90.5 ACQUIRED ABSENCE OF KIDNEY: Chronic | ICD-10-CM

## 2024-09-17 DIAGNOSIS — N32.89 OTHER SPECIFIED DISORDERS OF BLADDER: ICD-10-CM

## 2024-09-17 DIAGNOSIS — R35.0 FREQUENCY OF MICTURITION: ICD-10-CM

## 2024-09-17 PROCEDURE — 52000 CYSTOURETHROSCOPY: CPT

## 2024-09-18 DIAGNOSIS — N32.89 OTHER SPECIFIED DISORDERS OF BLADDER: ICD-10-CM

## 2024-09-27 ENCOUNTER — APPOINTMENT (OUTPATIENT)
Dept: CT IMAGING | Facility: CLINIC | Age: 69
End: 2024-09-27

## 2024-10-10 ENCOUNTER — OFFICE (OUTPATIENT)
Facility: LOCATION | Age: 69
Setting detail: OPHTHALMOLOGY
End: 2024-10-10
Payer: MEDICARE

## 2024-10-10 DIAGNOSIS — H43.393: ICD-10-CM

## 2024-10-10 DIAGNOSIS — H25.13: ICD-10-CM

## 2024-10-10 DIAGNOSIS — H43.813: ICD-10-CM

## 2024-10-10 PROCEDURE — 92250 FUNDUS PHOTOGRAPHY W/I&R: CPT | Performed by: OPHTHALMOLOGY

## 2024-10-10 PROCEDURE — 92014 COMPRE OPH EXAM EST PT 1/>: CPT | Performed by: OPHTHALMOLOGY

## 2024-10-10 ASSESSMENT — TONOMETRY
OD_IOP_MMHG: 16
OS_IOP_MMHG: 18

## 2024-10-10 ASSESSMENT — CONFRONTATIONAL VISUAL FIELD TEST (CVF)
OS_FINDINGS: FULL
OD_FINDINGS: FULL

## 2024-10-14 ASSESSMENT — REFRACTION_CURRENTRX
OD_OVR_VA: 20/
OD_AXIS: 147
OS_SPHERE: -2.75
OS_OVR_VA: 20/
OS_AXIS: 25
OD_VPRISM_DIRECTION: SV
OD_SPHERE: -1.00
OS_VPRISM_DIRECTION: SV
OD_CYLINDER: +0.25
OS_CYLINDER: +0.50

## 2024-10-14 ASSESSMENT — REFRACTION_AUTOREFRACTION
OS_SPHERE: -4.75
OS_CYLINDER: +0.75
OS_AXIS: 006
OD_SPHERE: -1.00
OD_AXIS: 157
OD_CYLINDER: +0.50

## 2024-10-14 ASSESSMENT — VISUAL ACUITY
OS_BCVA: 20/30-2
OD_BCVA: 20/100

## 2025-01-30 ENCOUNTER — OFFICE (OUTPATIENT)
Dept: URBAN - METROPOLITAN AREA CLINIC 104 | Facility: CLINIC | Age: 70
Setting detail: OPHTHALMOLOGY
End: 2025-01-30
Payer: MEDICARE

## 2025-01-30 DIAGNOSIS — H43.393: ICD-10-CM

## 2025-01-30 DIAGNOSIS — H43.813: ICD-10-CM

## 2025-01-30 DIAGNOSIS — H25.12: ICD-10-CM

## 2025-01-30 DIAGNOSIS — H25.13: ICD-10-CM

## 2025-01-30 PROBLEM — H25.11 CATARACT SENILE NUCLEAR SCLEROSIS; RIGHT EYE, LEFT EYE, BOTH EYES: Status: ACTIVE | Noted: 2025-01-30

## 2025-01-30 PROCEDURE — 92014 COMPRE OPH EXAM EST PT 1/>: CPT | Performed by: OPHTHALMOLOGY

## 2025-01-30 PROCEDURE — 92136 OPHTHALMIC BIOMETRY: CPT | Mod: TC | Performed by: OPHTHALMOLOGY

## 2025-01-30 PROCEDURE — 92136 OPHTHALMIC BIOMETRY: CPT | Mod: 26,LT | Performed by: OPHTHALMOLOGY

## 2025-01-30 ASSESSMENT — KERATOMETRY
OS_AXISANGLE2_DEGREES: 097
OS_AXISANGLE_DEGREES: 7
OS_CYLPOWER_DEGREES: 0.96
OS_K2POWER_DIOPTERS: 45.49
OD_CYLPOWER_DEGREES: 1.39
OD_AXISANGLE_DEGREES: 177
OD_K2POWER_DIOPTERS: 45.86
OD_K1K2_AVERAGE: 45.16
OD_K1POWER_DIOPTERS: 44.47
OD_CYLAXISANGLE_DEGREES: 087
OS_CYLAXISANGLE_DEGREES: 097
OS_K1K2_AVERAGE: 45.01
OS_K1POWER_DIOPTERS: 44.53
OD_AXISANGLE_DEGREES: 087
OD_AXISANGLE2_DEGREES: 087
OD_K2POWER_DIOPTERS: 45.86
OD_K1POWER_DIOPTERS: 44.47
OS_K2POWER_DIOPTERS: 45.49
OS_AXISANGLE_DEGREES: 097
OS_K1POWER_DIOPTERS: 44.53

## 2025-01-30 ASSESSMENT — TONOMETRY
OD_IOP_MMHG: 16
OS_IOP_MMHG: 16

## 2025-01-30 ASSESSMENT — REFRACTION_CURRENTRX
OS_AXIS: 25
OD_VPRISM_DIRECTION: SV
OD_CYLINDER: +0.25
OD_AXIS: 147
OD_SPHERE: -1.00
OS_SPHERE: -2.75
OS_CYLINDER: +0.50
OD_OVR_VA: 20/
OS_OVR_VA: 20/
OS_VPRISM_DIRECTION: SV

## 2025-01-30 ASSESSMENT — REFRACTION_MANIFEST
OS_CYLINDER: -0.75
OS_VA1: 20/NI
OD_VA1: 20/NI
OD_CYLINDER: -0.50
OS_SPHERE: -4.00
OD_AXIS: 60
OS_AXIS: 110
OD_SPHERE: -0.50

## 2025-01-30 ASSESSMENT — VISUAL ACUITY
OD_BCVA: 20/80
OS_BCVA: 20/70

## 2025-01-30 ASSESSMENT — REFRACTION_AUTOREFRACTION
OS_AXIS: 110
OD_CYLINDER: -0.50
OD_SPHERE: -0.50
OS_CYLINDER: -1.00
OS_SPHERE: -4.50
OD_AXIS: 059

## 2025-01-30 ASSESSMENT — CONFRONTATIONAL VISUAL FIELD TEST (CVF)
OD_FINDINGS: FULL
OS_FINDINGS: FULL

## 2025-02-24 ENCOUNTER — APPOINTMENT (OUTPATIENT)
Dept: CT IMAGING | Facility: CLINIC | Age: 70
End: 2025-02-24
Payer: MEDICARE

## 2025-02-24 PROCEDURE — 82565 ASSAY OF CREATININE: CPT | Mod: QW

## 2025-02-24 PROCEDURE — 74178 CT ABD&PLV WO CNTR FLWD CNTR: CPT | Mod: TC

## 2025-02-28 ENCOUNTER — APPOINTMENT (OUTPATIENT)
Dept: INTERNAL MEDICINE | Facility: CLINIC | Age: 70
End: 2025-02-28
Payer: MEDICARE

## 2025-02-28 VITALS
DIASTOLIC BLOOD PRESSURE: 80 MMHG | HEIGHT: 71 IN | BODY MASS INDEX: 26.74 KG/M2 | OXYGEN SATURATION: 98 % | SYSTOLIC BLOOD PRESSURE: 145 MMHG | WEIGHT: 191 LBS | HEART RATE: 88 BPM

## 2025-02-28 DIAGNOSIS — Z87.01 PERSONAL HISTORY OF PNEUMONIA (RECURRENT): ICD-10-CM

## 2025-02-28 DIAGNOSIS — Z01.818 ENCOUNTER FOR OTHER PREPROCEDURAL EXAMINATION: ICD-10-CM

## 2025-02-28 DIAGNOSIS — C34.90 MALIGNANT NEOPLASM OF UNSPECIFIED PART OF UNSPECIFIED BRONCHUS OR LUNG: ICD-10-CM

## 2025-02-28 DIAGNOSIS — J98.8 OTHER SPECIFIED RESPIRATORY DISORDERS: ICD-10-CM

## 2025-02-28 PROCEDURE — 99213 OFFICE O/P EST LOW 20 MIN: CPT

## 2025-02-28 RX ORDER — SEMAGLUTIDE 2.4 MG/.75ML
2.4 INJECTION, SOLUTION SUBCUTANEOUS
Refills: 0 | Status: ACTIVE | COMMUNITY

## 2025-03-20 ENCOUNTER — RX ONLY (RX ONLY)
Age: 70
End: 2025-03-20

## 2025-03-20 ENCOUNTER — ASC (OUTPATIENT)
Dept: URBAN - METROPOLITAN AREA SURGERY 8 | Facility: SURGERY | Age: 70
Setting detail: OPHTHALMOLOGY
End: 2025-03-20
Payer: MEDICARE

## 2025-03-20 DIAGNOSIS — H25.12: ICD-10-CM

## 2025-03-20 DIAGNOSIS — H25.89: ICD-10-CM

## 2025-03-20 PROCEDURE — 66982 XCAPSL CTRC RMVL CPLX WO ECP: CPT | Mod: LT | Performed by: OPHTHALMOLOGY

## 2025-03-21 ENCOUNTER — OFFICE (OUTPATIENT)
Dept: URBAN - METROPOLITAN AREA CLINIC 104 | Facility: CLINIC | Age: 70
Setting detail: OPHTHALMOLOGY
End: 2025-03-21
Payer: MEDICARE

## 2025-03-21 ENCOUNTER — RX ONLY (RX ONLY)
Age: 70
End: 2025-03-21

## 2025-03-21 DIAGNOSIS — Z96.1: ICD-10-CM

## 2025-03-21 PROCEDURE — 99024 POSTOP FOLLOW-UP VISIT: CPT | Performed by: OPHTHALMOLOGY

## 2025-03-21 ASSESSMENT — VISUAL ACUITY
OS_BCVA: 20/
OD_BCVA: 20/25

## 2025-03-21 ASSESSMENT — TONOMETRY: OS_IOP_MMHG: 11

## 2025-03-21 ASSESSMENT — REFRACTION_CURRENTRX
OS_OVR_VA: 20/
OD_OVR_VA: 20/

## 2025-03-21 ASSESSMENT — CONFRONTATIONAL VISUAL FIELD TEST (CVF)
OS_FINDINGS: FULL
OD_FINDINGS: FULL

## 2025-03-25 ENCOUNTER — APPOINTMENT (OUTPATIENT)
Dept: UROLOGY | Facility: CLINIC | Age: 70
End: 2025-03-25
Payer: MEDICARE

## 2025-03-25 ENCOUNTER — OUTPATIENT (OUTPATIENT)
Dept: OUTPATIENT SERVICES | Facility: HOSPITAL | Age: 70
LOS: 1 days | End: 2025-03-25
Payer: MEDICARE

## 2025-03-25 VITALS — SYSTOLIC BLOOD PRESSURE: 158 MMHG | HEART RATE: 94 BPM | DIASTOLIC BLOOD PRESSURE: 81 MMHG

## 2025-03-25 DIAGNOSIS — C64.9 MALIGNANT NEOPLASM OF UNSPECIFIED KIDNEY, EXCEPT RENAL PELVIS: ICD-10-CM

## 2025-03-25 DIAGNOSIS — N32.89 OTHER SPECIFIED DISORDERS OF BLADDER: ICD-10-CM

## 2025-03-25 DIAGNOSIS — R35.0 FREQUENCY OF MICTURITION: ICD-10-CM

## 2025-03-25 DIAGNOSIS — Z90.02 ACQUIRED ABSENCE OF LARYNX: Chronic | ICD-10-CM

## 2025-03-25 DIAGNOSIS — Z90.5 ACQUIRED ABSENCE OF KIDNEY: Chronic | ICD-10-CM

## 2025-03-25 PROCEDURE — 52000 CYSTOURETHROSCOPY: CPT

## 2025-03-26 ENCOUNTER — OFFICE (OUTPATIENT)
Dept: URBAN - METROPOLITAN AREA CLINIC 104 | Facility: CLINIC | Age: 70
Setting detail: OPHTHALMOLOGY
End: 2025-03-26
Payer: MEDICARE

## 2025-03-26 DIAGNOSIS — Z96.1: ICD-10-CM

## 2025-03-26 DIAGNOSIS — N32.89 OTHER SPECIFIED DISORDERS OF BLADDER: ICD-10-CM

## 2025-03-26 DIAGNOSIS — H25.11: ICD-10-CM

## 2025-03-26 DIAGNOSIS — C64.9 MALIGNANT NEOPLASM OF UNSPECIFIED KIDNEY, EXCEPT RENAL PELVIS: ICD-10-CM

## 2025-03-26 LAB — URINE CYTOLOGY: NORMAL

## 2025-03-26 PROCEDURE — 92136 OPHTHALMIC BIOMETRY: CPT | Mod: 26,RT | Performed by: OPHTHALMOLOGY

## 2025-03-26 PROCEDURE — 99024 POSTOP FOLLOW-UP VISIT: CPT | Performed by: OPHTHALMOLOGY

## 2025-03-26 ASSESSMENT — TONOMETRY: OS_IOP_MMHG: 14

## 2025-03-26 ASSESSMENT — KERATOMETRY
OS_K1POWER_DIOPTERS: 44.23
OS_AXISANGLE_DEGREES: 096
OS_K2POWER_DIOPTERS: 45.49

## 2025-03-26 ASSESSMENT — REFRACTION_AUTOREFRACTION
OS_AXIS: 035
OS_SPHERE: +0.25
OS_CYLINDER: -0.50

## 2025-03-26 ASSESSMENT — CONFRONTATIONAL VISUAL FIELD TEST (CVF)
OS_FINDINGS: FULL
OD_FINDINGS: FULL

## 2025-03-26 ASSESSMENT — VISUAL ACUITY
OD_BCVA: 20/30+2
OS_BCVA: 20/

## 2025-03-27 ENCOUNTER — RX ONLY (RX ONLY)
Age: 70
End: 2025-03-27

## 2025-03-27 ENCOUNTER — ASC (OUTPATIENT)
Dept: URBAN - METROPOLITAN AREA SURGERY 8 | Facility: SURGERY | Age: 70
Setting detail: OPHTHALMOLOGY
End: 2025-03-27
Payer: MEDICARE

## 2025-03-27 DIAGNOSIS — H25.11: ICD-10-CM

## 2025-03-27 PROCEDURE — 66984 XCAPSL CTRC RMVL W/O ECP: CPT | Mod: 79,RT | Performed by: OPHTHALMOLOGY

## 2025-03-28 ENCOUNTER — OFFICE (OUTPATIENT)
Dept: URBAN - METROPOLITAN AREA CLINIC 104 | Facility: CLINIC | Age: 70
Setting detail: OPHTHALMOLOGY
End: 2025-03-28
Payer: MEDICARE

## 2025-03-28 DIAGNOSIS — Z96.1: ICD-10-CM

## 2025-03-28 PROCEDURE — 99024 POSTOP FOLLOW-UP VISIT: CPT | Performed by: OPHTHALMOLOGY

## 2025-03-28 ASSESSMENT — REFRACTION_AUTOREFRACTION
OD_CYLINDER: -1.00
OS_AXIS: 025
OS_CYLINDER: -0.75
OS_SPHERE: 0.00
OD_AXIS: 012
OD_SPHERE: 0.00

## 2025-03-28 ASSESSMENT — KERATOMETRY
OD_K2POWER_DIOPTERS: 48.00
OS_K1POWER_DIOPTERS: 44.25
OD_K1POWER_DIOPTERS: 45.25
OS_K2POWER_DIOPTERS: 45.75
OS_AXISANGLE_DEGREES: 097
OD_AXISANGLE_DEGREES: 092

## 2025-03-28 ASSESSMENT — CORNEAL EDEMA - FOLDS/STRIAE: OD_FOLDSSTRIAE: T

## 2025-03-28 ASSESSMENT — TONOMETRY: OD_IOP_MMHG: 15

## 2025-03-28 ASSESSMENT — CONFRONTATIONAL VISUAL FIELD TEST (CVF)
OS_FINDINGS: FULL
OD_FINDINGS: FULL

## 2025-03-28 ASSESSMENT — VISUAL ACUITY
OS_BCVA: 20/20
OD_BCVA: 20/20

## 2025-04-07 ENCOUNTER — RX ONLY (RX ONLY)
Age: 70
End: 2025-04-07

## 2025-04-07 ENCOUNTER — OFFICE (OUTPATIENT)
Dept: URBAN - METROPOLITAN AREA CLINIC 104 | Facility: CLINIC | Age: 70
Setting detail: OPHTHALMOLOGY
End: 2025-04-07
Payer: MEDICARE

## 2025-04-07 DIAGNOSIS — Z96.1: ICD-10-CM

## 2025-04-07 PROCEDURE — 99024 POSTOP FOLLOW-UP VISIT: CPT | Performed by: OPHTHALMOLOGY

## 2025-04-07 ASSESSMENT — REFRACTION_AUTOREFRACTION
OS_CYLINDER: -1.25
OS_AXIS: 48
OD_CYLINDER: -0.25
OD_AXIS: 10
OD_SPHERE: 0.00
OS_SPHERE: +0.25

## 2025-04-07 ASSESSMENT — CONFRONTATIONAL VISUAL FIELD TEST (CVF)
OD_FINDINGS: FULL
OS_FINDINGS: FULL

## 2025-04-07 ASSESSMENT — CORNEAL EDEMA - FOLDS/STRIAE: OD_FOLDSSTRIAE: T

## 2025-04-07 ASSESSMENT — VISUAL ACUITY
OD_BCVA: 20/20
OS_BCVA: 20/20

## 2025-05-05 ENCOUNTER — OFFICE (OUTPATIENT)
Dept: URBAN - METROPOLITAN AREA CLINIC 104 | Facility: CLINIC | Age: 70
Setting detail: OPHTHALMOLOGY
End: 2025-05-05
Payer: MEDICARE

## 2025-05-05 ENCOUNTER — RX ONLY (RX ONLY)
Age: 70
End: 2025-05-05

## 2025-05-05 DIAGNOSIS — H11.823: ICD-10-CM

## 2025-05-05 DIAGNOSIS — H16.223: ICD-10-CM

## 2025-05-05 DIAGNOSIS — Z96.1: ICD-10-CM

## 2025-05-05 PROCEDURE — 99024 POSTOP FOLLOW-UP VISIT: CPT | Performed by: OPHTHALMOLOGY

## 2025-05-05 ASSESSMENT — REFRACTION_AUTOREFRACTION
OD_AXIS: 174
OD_SPHERE: 0.00
OS_SPHERE: 0.00
OS_CYLINDER: -0.75
OD_CYLINDER: -0.50
OS_AXIS: 27

## 2025-05-05 ASSESSMENT — REFRACTION_MANIFEST
OS_VA1: 20/20
OD_CYLINDER: -0.50
OS_AXIS: 30
OD_AXIS: 175
OD_ADD: +2.00
OD_SPHERE: PLANO
OS_SPHERE: PLANO
OS_CYLINDER: -0.50
OD_VA1: 20/20
OS_ADD: +2.00

## 2025-05-05 ASSESSMENT — CONFRONTATIONAL VISUAL FIELD TEST (CVF)
OS_FINDINGS: FULL
OD_FINDINGS: FULL

## 2025-05-05 ASSESSMENT — TONOMETRY
OS_IOP_MMHG: 16
OD_IOP_MMHG: 16

## 2025-05-05 ASSESSMENT — TEAR BREAK UP TIME (TBUT)
OS_TBUT: 1+
OD_TBUT: 1+

## 2025-05-05 ASSESSMENT — VISUAL ACUITY
OS_BCVA: 20/20
OD_BCVA: 20/20

## 2025-05-05 ASSESSMENT — CORNEAL EDEMA - FOLDS/STRIAE: OD_FOLDSSTRIAE: ABSENT

## (undated) DEVICE — SOL IRR BAG NS 0.9% 3000ML

## (undated) DEVICE — BAG URINE W METER 2L

## (undated) DEVICE — PACK CYSTO

## (undated) DEVICE — SYR CATH TIP 2 OZ

## (undated) DEVICE — TUBING THERMADX UROLOGY

## (undated) DEVICE — POSITIONER STRAP ARMBOARD VELCRO TS-30

## (undated) DEVICE — DRAPE TOWEL BLUE 17" X 24"

## (undated) DEVICE — VENODYNE/SCD SLEEVE CALF MEDIUM

## (undated) DEVICE — ELCTR GROUNDING PAD ADULT COVIDIEN

## (undated) DEVICE — GLV 7 PROTEXIS (WHITE)

## (undated) DEVICE — DRAPE COVER SNAP 36X30"

## (undated) DEVICE — WARMING BLANKET UPPER ADULT

## (undated) DEVICE — CABLE DAC ACTIVE CORD

## (undated) DEVICE — CANISTER DISPOSABLE THIN WALL 3000CC

## (undated) DEVICE — SOL IRR BAG H2O 3000ML

## (undated) DEVICE — SOL IRR POUR H2O 1500ML

## (undated) DEVICE — SOL IRR POUR H2O 500ML

## (undated) DEVICE — TUBING LEVEL ONE NORMOFLO SET

## (undated) DEVICE — SYR LUER LOK 10CC

## (undated) DEVICE — ELCTR PLASMA LOOP MEDIUM ANGLED 24FR 12-30 DEG

## (undated) DEVICE — ADAPTER CHECK FLO 9FR STERILE